# Patient Record
Sex: MALE | Race: WHITE | NOT HISPANIC OR LATINO | Employment: OTHER | URBAN - NONMETROPOLITAN AREA
[De-identification: names, ages, dates, MRNs, and addresses within clinical notes are randomized per-mention and may not be internally consistent; named-entity substitution may affect disease eponyms.]

---

## 2019-08-27 ENCOUNTER — LAB (OUTPATIENT)
Dept: LAB | Facility: HOSPITAL | Age: 69
End: 2019-08-27

## 2019-08-27 ENCOUNTER — OFFICE VISIT (OUTPATIENT)
Dept: CARDIOLOGY | Facility: CLINIC | Age: 69
End: 2019-08-27

## 2019-08-27 VITALS
WEIGHT: 210 LBS | SYSTOLIC BLOOD PRESSURE: 144 MMHG | HEIGHT: 70 IN | BODY MASS INDEX: 30.06 KG/M2 | OXYGEN SATURATION: 98 % | DIASTOLIC BLOOD PRESSURE: 85 MMHG | HEART RATE: 59 BPM

## 2019-08-27 DIAGNOSIS — I25.84 CORONARY ARTERY DISEASE DUE TO CALCIFIED CORONARY LESION: Primary | ICD-10-CM

## 2019-08-27 DIAGNOSIS — R00.2 PALPITATIONS: ICD-10-CM

## 2019-08-27 DIAGNOSIS — Z99.89 OSA ON CPAP: ICD-10-CM

## 2019-08-27 DIAGNOSIS — R61 DIAPHORESIS: ICD-10-CM

## 2019-08-27 DIAGNOSIS — I25.10 CORONARY ARTERY DISEASE DUE TO CALCIFIED CORONARY LESION: Primary | ICD-10-CM

## 2019-08-27 DIAGNOSIS — R06.02 SHORTNESS OF BREATH: ICD-10-CM

## 2019-08-27 DIAGNOSIS — R53.83 FATIGUE, UNSPECIFIED TYPE: ICD-10-CM

## 2019-08-27 DIAGNOSIS — I10 ESSENTIAL HYPERTENSION: ICD-10-CM

## 2019-08-27 DIAGNOSIS — G47.33 OSA ON CPAP: ICD-10-CM

## 2019-08-27 DIAGNOSIS — E78.2 MIXED HYPERLIPIDEMIA: ICD-10-CM

## 2019-08-27 DIAGNOSIS — R42 DIZZINESS: ICD-10-CM

## 2019-08-27 DIAGNOSIS — R07.2 PRECORDIAL PAIN: ICD-10-CM

## 2019-08-27 PROBLEM — Z87.891 FORMER SMOKER: Status: ACTIVE | Noted: 2019-08-27

## 2019-08-27 PROBLEM — C44.91 BASAL CELL CARCINOMA: Status: ACTIVE | Noted: 2019-08-27

## 2019-08-27 PROBLEM — K21.9 GERD (GASTROESOPHAGEAL REFLUX DISEASE): Status: ACTIVE | Noted: 2019-08-27

## 2019-08-27 PROCEDURE — 84443 ASSAY THYROID STIM HORMONE: CPT | Performed by: INTERNAL MEDICINE

## 2019-08-27 PROCEDURE — 36415 COLL VENOUS BLD VENIPUNCTURE: CPT

## 2019-08-27 PROCEDURE — 99204 OFFICE O/P NEW MOD 45 MIN: CPT | Performed by: INTERNAL MEDICINE

## 2019-08-27 RX ORDER — PANTOPRAZOLE SODIUM 40 MG/1
TABLET, DELAYED RELEASE ORAL DAILY
COMMUNITY
Start: 2019-08-21 | End: 2020-11-23 | Stop reason: SDUPTHER

## 2019-08-27 RX ORDER — AMLODIPINE BESYLATE 2.5 MG/1
2.5 TABLET ORAL DAILY
Qty: 30 TABLET | Refills: 11 | Status: SHIPPED | OUTPATIENT
Start: 2019-08-27 | End: 2019-10-28 | Stop reason: SDUPTHER

## 2019-08-27 RX ORDER — SIMVASTATIN 80 MG
TABLET ORAL NIGHTLY
COMMUNITY
Start: 2019-07-20 | End: 2019-09-04

## 2019-08-28 LAB — TSH SERPL DL<=0.05 MIU/L-ACNC: 0.53 MIU/ML (ref 0.27–4.2)

## 2019-09-04 ENCOUNTER — TELEPHONE (OUTPATIENT)
Dept: CARDIOLOGY | Facility: CLINIC | Age: 69
End: 2019-09-04

## 2019-09-04 DIAGNOSIS — E78.2 MIXED HYPERLIPIDEMIA: Primary | ICD-10-CM

## 2019-09-04 RX ORDER — PRAVASTATIN SODIUM 40 MG
40 TABLET ORAL DAILY
Qty: 30 TABLET | Refills: 3 | Status: SHIPPED | OUTPATIENT
Start: 2019-09-04 | End: 2019-10-23 | Stop reason: SDUPTHER

## 2019-09-04 NOTE — TELEPHONE ENCOUNTER
Zocor stopped.  Rx for Pravastatin sent to [facility].  Tried contacting patient x 2 with no success.  Will continue to try to contact patient.        ----- Message from JEFFERY Diaz sent at 9/4/2019 12:59 PM EDT -----  Regarding: RE: medication interaction  Change simvistatin to pravastatin 40mg  ----- Message -----  From: Fabiola Horn MA  Sent: 9/4/2019   8:34 AM  To: JEFFERY Diaz  Subject: medication interaction                           Mandie velasquez/ [facility] pharmacy contacts office with concerns of starting amlodipine while patient is taking Zocor 80 mg.  States contraindications with these two medications if patient is taking more than Zocor 20 mg.  Please advise.

## 2019-09-04 NOTE — TELEPHONE ENCOUNTER
Patient returned call and aware of the following. JENNIFER Corona Maeda, MA   Medical Assistant      Telephone Encounter   Addendum   Encounter Date:  9/4/2019                 Zocor stopped.  Rx for Pravastatin sent to [facility].  Tried contacting patient x 2 with no success.  Will continue to try to contact patient.          ----- Message from JEFFERY Diaz sent at 9/4/2019 12:59 PM EDT -----  Regarding: RE: medication interaction  Change simvistatin to pravastatin 40mg  ----- Message -----  From: Fabiola Horn MA  Sent: 9/4/2019   8:34 AM  To: JEFFERY Diaz  Subject: medication interaction                            Mandie velasquez/ [facility] contacts office with concerns of starting amlodipine while patient is taking Zocor 80 mg.  States contraindications with these two medications if patient is taking more than Zocor 20 mg.  Please advise.

## 2019-09-13 ENCOUNTER — TELEPHONE (OUTPATIENT)
Dept: CARDIOLOGY | Facility: CLINIC | Age: 69
End: 2019-09-13

## 2019-09-13 NOTE — TELEPHONE ENCOUNTER
Patient notified that with his symptoms Dr Bo is looking for possible A fib so that he needs to wear the monitor longer in order to get the best information for an accurate diagnosis. Pt verbalized understanding.  EMILE MORGAN    ----- Message from [place] sent at 9/13/2019 11:10 AM EDT -----   Pt called stating he was wearing monitor and wants to know why his was ordered for 30 days when most of the time it is for 14 days. He can be reached at [phone]

## 2019-09-17 ENCOUNTER — HOSPITAL ENCOUNTER (OUTPATIENT)
Dept: CARDIOLOGY | Facility: HOSPITAL | Age: 69
Discharge: HOME | End: 2019-09-17

## 2019-09-17 DIAGNOSIS — R42 DIZZINESS: ICD-10-CM

## 2019-09-17 DIAGNOSIS — R07.2 PRECORDIAL PAIN: ICD-10-CM

## 2019-09-17 DIAGNOSIS — R61 DIAPHORESIS: ICD-10-CM

## 2019-09-17 DIAGNOSIS — I25.84 CORONARY ARTERY DISEASE DUE TO CALCIFIED CORONARY LESION: ICD-10-CM

## 2019-09-17 DIAGNOSIS — R00.2 PALPITATIONS: ICD-10-CM

## 2019-09-17 DIAGNOSIS — R06.02 SHORTNESS OF BREATH: ICD-10-CM

## 2019-09-17 DIAGNOSIS — E78.2 MIXED HYPERLIPIDEMIA: ICD-10-CM

## 2019-09-17 DIAGNOSIS — I25.10 CORONARY ARTERY DISEASE DUE TO CALCIFIED CORONARY LESION: ICD-10-CM

## 2019-09-17 PROCEDURE — 78452 HT MUSCLE IMAGE SPECT MULT: CPT | Performed by: INTERNAL MEDICINE

## 2019-09-17 PROCEDURE — 25010000002 REGADENOSON 0.4 MG/5ML SOLUTION: Performed by: INTERNAL MEDICINE

## 2019-09-17 PROCEDURE — 78452 HT MUSCLE IMAGE SPECT MULT: CPT

## 2019-09-17 PROCEDURE — 93306 TTE W/DOPPLER COMPLETE: CPT | Performed by: INTERNAL MEDICINE

## 2019-09-17 PROCEDURE — 93306 TTE W/DOPPLER COMPLETE: CPT

## 2019-09-17 PROCEDURE — A9500 TC99M SESTAMIBI: HCPCS | Performed by: INTERNAL MEDICINE

## 2019-09-17 PROCEDURE — 93018 CV STRESS TEST I&R ONLY: CPT | Performed by: INTERNAL MEDICINE

## 2019-09-17 PROCEDURE — 0 TECHNETIUM SESTAMIBI: Performed by: INTERNAL MEDICINE

## 2019-09-17 PROCEDURE — 93017 CV STRESS TEST TRACING ONLY: CPT

## 2019-09-17 RX ADMIN — REGADENOSON 0.4 MG: 0.08 INJECTION, SOLUTION INTRAVENOUS at 12:00

## 2019-09-17 RX ADMIN — TECHNETIUM TC 99M SESTAMIBI 1 DOSE: 1 INJECTION INTRAVENOUS at 10:43

## 2019-09-17 RX ADMIN — TECHNETIUM TC 99M SESTAMIBI 1 DOSE: 1 INJECTION INTRAVENOUS at 12:00

## 2019-09-18 ENCOUNTER — TELEPHONE (OUTPATIENT)
Dept: CARDIOLOGY | Facility: CLINIC | Age: 69
End: 2019-09-18

## 2019-09-18 LAB
BH CV NUCLEAR PRIOR STUDY: 3
BH CV STRESS BP STAGE 1: NORMAL
BH CV STRESS COMMENTS STAGE 1: NORMAL
BH CV STRESS DOSE REGADENOSON STAGE 1: 0.4
BH CV STRESS DURATION MIN STAGE 1: 0
BH CV STRESS DURATION SEC STAGE 1: 10
BH CV STRESS HR STAGE 1: 93
BH CV STRESS PROTOCOL 1: NORMAL
BH CV STRESS RECOVERY BP: NORMAL MMHG
BH CV STRESS RECOVERY HR: 78 BPM
BH CV STRESS STAGE 1: 1
MAXIMAL PREDICTED HEART RATE: 150 BPM
PERCENT MAX PREDICTED HR: 64.67 %
STRESS BASELINE BP: NORMAL MMHG
STRESS BASELINE HR: 70 BPM
STRESS PERCENT HR: 76 %
STRESS POST PEAK BP: NORMAL MMHG
STRESS POST PEAK HR: 97 BPM
STRESS TARGET HR: 128 BPM

## 2019-09-18 NOTE — TELEPHONE ENCOUNTER
PATIENT AWARE STRESS TEST NEG. FOR ISCHEMIA AND TO KEEP F/U APPT. AS SCHEDULED. LUIS LUJAN        ----- Message from Ghassan Bo MD sent at 9/18/2019 12:21 PM EDT -----  Routine f/u.

## 2019-09-29 LAB
BH CV ECHO MEAS - ACS: 2.3 CM
BH CV ECHO MEAS - AO MEAN PG: 2.7 MMHG
BH CV ECHO MEAS - AO ROOT AREA (BSA CORRECTED): 1.6
BH CV ECHO MEAS - AO ROOT AREA: 9.2 CM^2
BH CV ECHO MEAS - AO ROOT DIAM: 3.4 CM
BH CV ECHO MEAS - AO V2 MEAN: 76.7 CM/SEC
BH CV ECHO MEAS - AO V2 VTI: 23.7 CM
BH CV ECHO MEAS - BSA(HAYCOCK): 2.2 M^2
BH CV ECHO MEAS - BSA: 2.1 M^2
BH CV ECHO MEAS - BZI_BMI: 30.1 KILOGRAMS/M^2
BH CV ECHO MEAS - BZI_METRIC_HEIGHT: 177.8 CM
BH CV ECHO MEAS - BZI_METRIC_WEIGHT: 95.3 KG
BH CV ECHO MEAS - EDV(CUBED): 62.7 ML
BH CV ECHO MEAS - EDV(MOD-SP4): 51 ML
BH CV ECHO MEAS - EDV(TEICH): 68.9 ML
BH CV ECHO MEAS - EF(CUBED): 66.6 %
BH CV ECHO MEAS - EF(MOD-SP4): 54.9 %
BH CV ECHO MEAS - EF(TEICH): 58.7 %
BH CV ECHO MEAS - ESV(CUBED): 21 ML
BH CV ECHO MEAS - ESV(MOD-SP4): 23 ML
BH CV ECHO MEAS - ESV(TEICH): 28.4 ML
BH CV ECHO MEAS - FS: 30.6 %
BH CV ECHO MEAS - IVS/LVPW: 0.84
BH CV ECHO MEAS - IVSD: 0.98 CM
BH CV ECHO MEAS - LA DIMENSION: 3.5 CM
BH CV ECHO MEAS - LA/AO: 1
BH CV ECHO MEAS - LV DIASTOLIC VOL/BSA (35-75): 23.9 ML/M^2
BH CV ECHO MEAS - LV IVRT: 0.11 SEC
BH CV ECHO MEAS - LV MASS(C)D: 139.7 GRAMS
BH CV ECHO MEAS - LV MASS(C)DI: 65.5 GRAMS/M^2
BH CV ECHO MEAS - LV SYSTOLIC VOL/BSA (12-30): 10.8 ML/M^2
BH CV ECHO MEAS - LVIDD: 4 CM
BH CV ECHO MEAS - LVIDS: 2.8 CM
BH CV ECHO MEAS - LVLD AP4: 6.9 CM
BH CV ECHO MEAS - LVLS AP4: 6.5 CM
BH CV ECHO MEAS - LVOT AREA (M): 3.5 CM^2
BH CV ECHO MEAS - LVOT AREA: 3.6 CM^2
BH CV ECHO MEAS - LVOT DIAM: 2.1 CM
BH CV ECHO MEAS - LVPWD: 1.2 CM
BH CV ECHO MEAS - MV A MAX VEL: 69.1 CM/SEC
BH CV ECHO MEAS - MV DEC SLOPE: 268.7 CM/SEC^2
BH CV ECHO MEAS - MV E MAX VEL: 80.5 CM/SEC
BH CV ECHO MEAS - MV E/A: 1.2
BH CV ECHO MEAS - RVDD: 2.7 CM
BH CV ECHO MEAS - SI(AO): 102.7 ML/M^2
BH CV ECHO MEAS - SI(CUBED): 19.6 ML/M^2
BH CV ECHO MEAS - SI(MOD-SP4): 13.1 ML/M^2
BH CV ECHO MEAS - SI(TEICH): 19 ML/M^2
BH CV ECHO MEAS - SV(AO): 218.8 ML
BH CV ECHO MEAS - SV(CUBED): 41.8 ML
BH CV ECHO MEAS - SV(MOD-SP4): 28 ML
BH CV ECHO MEAS - SV(TEICH): 40.4 ML
MAXIMAL PREDICTED HEART RATE: 150 BPM
STRESS TARGET HR: 128 BPM

## 2019-09-30 ENCOUNTER — TELEPHONE (OUTPATIENT)
Dept: CARDIOLOGY | Facility: CLINIC | Age: 69
End: 2019-09-30

## 2019-09-30 NOTE — TELEPHONE ENCOUNTER
ECHO RESULTS DISCUSSED WITH MR. LUU. LE,LPN          ----- Message from Keiry Lloyd sent at 9/30/2019  1:11 PM EDT -----  PT REQUESTING TEST RESULTS

## 2019-10-23 DIAGNOSIS — E78.2 MIXED HYPERLIPIDEMIA: ICD-10-CM

## 2019-10-23 RX ORDER — PRAVASTATIN SODIUM 40 MG
40 TABLET ORAL DAILY
Qty: 90 TABLET | Refills: 3 | Status: SHIPPED | OUTPATIENT
Start: 2019-10-23 | End: 2020-11-23

## 2019-10-28 ENCOUNTER — OFFICE VISIT (OUTPATIENT)
Dept: CARDIOLOGY | Facility: CLINIC | Age: 69
End: 2019-10-28

## 2019-10-28 VITALS
HEART RATE: 66 BPM | HEIGHT: 70 IN | WEIGHT: 208.6 LBS | DIASTOLIC BLOOD PRESSURE: 81 MMHG | OXYGEN SATURATION: 97 % | BODY MASS INDEX: 29.86 KG/M2 | SYSTOLIC BLOOD PRESSURE: 138 MMHG

## 2019-10-28 DIAGNOSIS — I25.10 CORONARY ARTERY DISEASE DUE TO CALCIFIED CORONARY LESION: ICD-10-CM

## 2019-10-28 DIAGNOSIS — R06.02 SHORTNESS OF BREATH: ICD-10-CM

## 2019-10-28 DIAGNOSIS — Z99.89 OSA ON CPAP: ICD-10-CM

## 2019-10-28 DIAGNOSIS — R61 DIAPHORESIS: ICD-10-CM

## 2019-10-28 DIAGNOSIS — E78.2 MIXED HYPERLIPIDEMIA: Primary | ICD-10-CM

## 2019-10-28 DIAGNOSIS — I25.84 CORONARY ARTERY DISEASE DUE TO CALCIFIED CORONARY LESION: ICD-10-CM

## 2019-10-28 DIAGNOSIS — I10 ESSENTIAL HYPERTENSION: ICD-10-CM

## 2019-10-28 DIAGNOSIS — G47.33 OSA ON CPAP: ICD-10-CM

## 2019-10-28 DIAGNOSIS — R07.2 PRECORDIAL PAIN: ICD-10-CM

## 2019-10-28 DIAGNOSIS — Z87.891 FORMER SMOKER: ICD-10-CM

## 2019-10-28 PROCEDURE — 99213 OFFICE O/P EST LOW 20 MIN: CPT | Performed by: INTERNAL MEDICINE

## 2019-10-28 RX ORDER — AMLODIPINE BESYLATE 2.5 MG/1
2.5 TABLET ORAL DAILY
Qty: 90 TABLET | Refills: 3 | Status: SHIPPED | OUTPATIENT
Start: 2019-10-28 | End: 2020-09-11 | Stop reason: SDUPTHER

## 2019-10-28 NOTE — PROGRESS NOTES
Subjective   Anthony Sahu is a 68 y.o. male     Chief Complaint   Patient presents with   • Coronary Artery Disease     Here for 2 mo. follow up    • Palpitations   • Hyperlipidemia       PROBLEM LIST:     1. CAD  1.1 [facility], , demonstrated mod. Mid-LAD disease. EF 65%  1.2 Stress test, 9-17-19, no ischemia  2. Hyperlipidemia  3. RAMONA, uses c-pap, followed by Dr. Daniels.  4. Basal cell Carcinoma left ear  5. GERD  6. Shortness of breath  7. Former smoker  8. Echo, 9-17-19, grade 1 DD, trivial-mild TR, pulm. Pressures 30-35 mmHg  9. Palpitations  9.1 Event Monitor, 9-5-19 - 10-4-19, sinus, SB sleeping hors, occas. PVC'S      Specialty Problems        Cardiology Problems    Coronary artery disease due to calcified coronary lesion        Mixed hyperlipidemia                HPI:  Anthony returns for follow-up on test results.  His chest pain has resolved but he continues to have episodic predominantly exertional dyspnea without cough or wheeze.    Stress test demonstrated no evidence of ischemia with preserved LV systolic function.    Echo confirmed normal ejection fraction with grade 1 diastolic dysfunction, no significant valve, pericardial, or great vessel pathology, and normal pulmonary pressures.    Event monitor demonstrated PVCs majority of symptoms occurring during sinus rhythm or sinus tachycardia.  No sustained ectopy was identified.    Jessica is tolerated low-dose amlodipine well without orthostasis or edema.                PRIOR MEDICATIONS    Current Outpatient Medications on File Prior to Visit   Medication Sig Dispense Refill   • amLODIPine (NORVASC) 2.5 MG tablet Take 1 tablet by mouth Daily. 30 tablet 11   • aspirin 81 MG tablet Take 81 mg by mouth Daily.     • pantoprazole (PROTONIX) 40 MG EC tablet Daily.     • pravastatin (PRAVACHOL) 40 MG tablet Take 1 tablet by mouth Daily for 120 days. 90 tablet 3     No current facility-administered medications on file prior to visit.         ALLERGIES:    Penicillins; Doxycycline; Keflex [cephalexin]; and Meclizine    PAST MEDICAL HISTORY:    Past Medical History:   Diagnosis Date   • Cancer (CMS/HCC)     basal cell carcinoma left ear   • Chest pain    • GERD (gastroesophageal reflux disease)    • Hyperlipidemia    • Shortness of breath    • Sleep apnea     uses c-pap followed by Dr. waters.   • Stomach ulcer        SURGICAL HISTORY:    Past Surgical History:   Procedure Laterality Date   • ENDOSCOPY AND COLONOSCOPY     • EXTRACORPOREAL SHOCK WAVE LITHOTRIPSY (ESWL)     • INGUINAL HERNIA REPAIR     • TESTICLE SURGERY      removal x 1       SOCIAL HISTORY:    Social History     Socioeconomic History   • Marital status:      Spouse name: Not on file   • Number of children: Not on file   • Years of education: Not on file   • Highest education level: Not on file   Tobacco Use   • Smoking status: Former Smoker     Types: Cigarettes   • Smokeless tobacco: Never Used   • Tobacco comment: used to smoke approx 1 PPD for approx. 30 yrs.    Substance and Sexual Activity   • Alcohol use: No     Frequency: Never   • Drug use: No       FAMILY HISTORY:    Family History   Problem Relation Age of Onset   • No Known Problems Mother    • Stroke Father    • Heart attack Father    • Hypertension Father    • Hyperlipidemia Father    • Diabetes Father        Review of Systems   Constitutional: Positive for fatigue.   HENT: Negative.    Eyes: Negative.    Respiratory: Positive for shortness of breath (occas.).    Cardiovascular: Negative.    Gastrointestinal: Negative.    Endocrine: Negative.    Genitourinary: Negative.    Musculoskeletal: Positive for arthralgias and myalgias.   Skin: Negative.    Allergic/Immunologic: Negative.    Neurological: Negative.    Hematological: Bruises/bleeds easily (bruise).   Psychiatric/Behavioral: Positive for sleep disturbance.       VISIT VITALS:  Vitals:    10/28/19 1023   BP: 138/81   BP Location: Left arm   Patient  "Position: Sitting   Pulse: 66   SpO2: 97%   Weight: 94.6 kg (208 lb 9.6 oz)   Height: 177.8 cm (70\")      /81 (BP Location: Left arm, Patient Position: Sitting)   Pulse 66   Ht 177.8 cm (70\")   Wt 94.6 kg (208 lb 9.6 oz)   SpO2 97%   BMI 29.93 kg/m²     RECENT LABS:    Objective       Physical Exam    Procedures      Assessment/Plan   #1.  Coronary artery disease.  Mr. Sahu had minor nonobstructive disease by cath approximately a decade ago.  Recent stress test demonstrated no evidence of ischemia.  As symptoms have resolved, only risk factor modification is indicated at this time.  In that regard patient had fasting lipid profile liver enzymes done in the next month at Dr. Reyes's office and we will request those.    2.  Systemic hypertension.  Blood pressures are controlled on very low dose amlodipine.  We will continue.    3.  Treated dyslipidemia as above.    4.  Palpitations without significant dysrhythmia on event monitoring.  No further evaluation at this time.    5.  Exertional dyspnea.  Given the negative work-up above I think is reasonable to perform pulmonary function studies to further evaluate for reactive airways disease or pulmonary parenchymal disease as contributing factors.  Mr. Brown will follow with Dr. Reyes in that regard and we will plan on seeing him in our office on a yearly or as needed basis.   Diagnosis Plan   1. Mixed hyperlipidemia     2. Coronary artery disease due to calcified coronary lesion     3. RAMONA on CPAP     4. Shortness of breath     5. Precordial pain     6. Former smoker     7. Diaphoresis         No Follow-up on file.           Patient's Body mass index is 29.93 kg/m². BMI is above normal parameters. Recommendations include: educational material and referral to primary care.       Yuly Fish LPN    Scribed for Dr. Ghassan Bo by Yuly Fish LPN October 28, 2019 10:49 AM         Electronically signed by:            This note is dictated " utilizing voice recognition software.  Although this record has been proof read, transcriptional errors may still be present. If questions occur regarding the content of this record please do not hesitate to call our office.

## 2019-10-28 NOTE — PATIENT INSTRUCTIONS
"Fat and Cholesterol Restricted Eating Plan  Getting too much fat and cholesterol in your diet may cause health problems. Choosing the right foods helps keep your fat and cholesterol at normal levels. This can keep you from getting certain diseases.  Your doctor may recommend an eating plan that includes:  · Total fat: ______% or less of total calories a day.  · Saturated fat: ______% or less of total calories a day.  · Cholesterol: less than _________mg a day.  · Fiber: ______g a day.  What are tips for following this plan?  General tips    · Work with your doctor to lose weight if you need to.  · Avoid:  ? Foods with added sugar.  ? Fried foods.  ? Foods with partially hydrogenated oils.  · Limit alcohol intake to no more than 1 drink a day for nonpregnant women and 2 drinks a day for men. One drink equals 12 oz of beer, 5 oz of wine, or 1½ oz of hard liquor.  Reading food labels  · Check food labels for:  ? Trans fats.  ? Partially hydrogenated oils.  ? Saturated fat (g) in each serving.  ? Cholesterol (mg) in each serving.  ? Fiber (g) in each serving.  · Choose foods with healthy fats, such as:  ? Monounsaturated fats.  ? Polyunsaturated fats.  ? Omega-3 fats.  · Choose grain products that have whole grains. Look for the word \"whole\" as the first word in the ingredient list.  Cooking  · Cook foods using low-fat methods. These include baking, boiling, grilling, and broiling.  · Eat more home-cooked foods. Eat at restaurants and buffets less often.  · Avoid cooking using saturated fats, such as butter, cream, palm oil, palm kernel oil, and coconut oil.  Meal planning    · At meals, divide your plate into four equal parts:  ? Fill one-half of your plate with vegetables and green salads.  ? Fill one-fourth of your plate with whole grains.  ? Fill one-fourth of your plate with low-fat (lean) protein foods.  · Eat fish that is high in omega-3 fats at least two times a week. This includes mackerel, tuna, sardines, and " salmon.  · Eat foods that are high in fiber, such as whole grains, beans, apples, broccoli, carrots, peas, and barley.  Recommended foods  Grains  · Whole grains, such as whole wheat or whole grain breads, crackers, cereals, and pasta. Unsweetened oatmeal, bulgur, barley, quinoa, or brown rice. Corn or whole wheat flour tortillas.  Vegetables  · Fresh or frozen vegetables (raw, steamed, roasted, or grilled). Green salads.  Fruits  · All fresh, canned (in natural juice), or frozen fruits.  Meats and other protein foods  · Ground beef (85% or leaner), grass-fed beef, or beef trimmed of fat. Skinless chicken or turkey. Ground chicken or turkey. Pork trimmed of fat. All fish and seafood. Egg whites. Dried beans, peas, or lentils. Unsalted nuts or seeds. Unsalted canned beans. Nut butters without added sugar or oil.  Dairy  · Low-fat or nonfat dairy products, such as skim or 1% milk, 2% or reduced-fat cheeses, low-fat and fat-free ricotta or cottage cheese, or plain low-fat and nonfat yogurt.  Fats and oils  · Tub margarine without trans fats. Light or reduced-fat mayonnaise and salad dressings. Avocado. Olive, canola, sesame, or safflower oils.  The items listed above may not be a complete list of recommended foods or beverages. Contact your dietitian for more options.  The items listed above may not be a complete list of foods and beverages [you/your child] can eat. Contact a dietitian for more information.  Foods to avoid  Grains  · White bread. White pasta. White rice. Cornbread. Bagels, pastries, and croissants. Crackers and snack foods that contain trans fat and hydrogenated oils.  Vegetables  · Vegetables cooked in cheese, cream, or butter sauce. Fried vegetables.  Fruits  · Canned fruit in heavy syrup. Fruit in cream or butter sauce. Fried fruit.  Meats and other protein foods  · Fatty cuts of meat. Ribs, chicken wings, alcazar, sausage, bologna, salami, chitterlings, fatback, hot dogs, bratwurst, and packaged  lunch meats. Liver and organ meats. Whole eggs and egg yolks. Chicken and turkey with skin. Fried meat.  Dairy  · Whole or 2% milk, cream, half-and-half, and cream cheese. Whole milk cheeses. Whole-fat or sweetened yogurt. Full-fat cheeses. Nondairy creamers and whipped toppings. Processed cheese, cheese spreads, and cheese curds.  Beverages  · Alcohol. Sugar-sweetened drinks such as sodas, lemonade, and fruit drinks.  Fats and oils  · Butter, stick margarine, lard, shortening, ghee, or alcazar fat. Coconut, palm kernel, and palm oils.  Sweets and desserts  · Corn syrup, sugars, honey, and molasses. Candy. Jam and jelly. Syrup. Sweetened cereals. Cookies, pies, cakes, donuts, muffins, and ice cream.  The items listed above may not be a complete list of foods and beverages to avoid. Contact your dietitian for more information.  The items listed above may not be a complete list of foods and beverages [you/your child] should avoid. Contact a dietitian for more information.  Summary  · Choosing the right foods helps keep your fat and cholesterol at normal levels. This can keep you from getting certain diseases.  · At meals, fill one-half of your plate with vegetables and green salads.  · Eat high-fiber foods, like whole grains, beans, apples, carrots, peas, and barley.  · Limit added sugar, saturated fats, alcohol, and fried foods.  This information is not intended to replace advice given to you by your health care provider. Make sure you discuss any questions you have with your health care provider.  Document Released: 06/18/2013 Document Revised: 09/04/2018 Document Reviewed: 09/04/2018  [employer] Interactive Patient Education © 2019 [employer]  BMI for Adults    Body mass index (BMI) is a number that is calculated from a person's weight and height. BMI may help to estimate how much of a person's weight is composed of fat. BMI can help identify those who may be at higher risk for certain medical problems.  How is BMI  "used with adults?  BMI is used as a screening tool to identify possible weight problems. It is used to check whether a person is obese, overweight, healthy weight, or underweight.  How is BMI calculated?  BMI measures your weight and compares it to your height. This can be done either in English ([place] or metric measurements. Note that charts are available to help you find your BMI quickly and easily without having to do these calculations yourself.  To calculate your BMI in English ([place] measurements, your health care provider will:  1. Measure your weight in pounds (lb).  2. Multiply the number of pounds by 703.  ? For example, for a person who weighs 180 lb, multiply that number by 703, which equals 126,540.  3. Measure your height in inches (in). Then multiply that number by itself to get a measurement called \"inches squared.\"  ? For example, for a person who is 70 in tall, the \"inches squared\" measurement is 70 in x 70 in, which equals 4900 inches squared.  4. Divide the total from Step 2 (number of lb x 703) by the total from Step 3 (inches squared): 126,540 ÷ 4900 = 25.8. This is your BMI.  To calculate your BMI in metric measurements, your health care provider will:  1. Measure your weight in kilograms (kg).  2. Measure your height in meters (m). Then multiply that number by itself to get a measurement called \"meters squared.\"  ? For example, for a person who is 1.75 m tall, the \"meters squared\" measurement is 1.75 m x 1.75 m, which is equal to 3.1 meters squared.  3. Divide the number of kilograms (your weight) by the meters squared number. In this example: 70 ÷ 3.1 = 22.6. This is your BMI.  How is BMI interpreted?  To interpret your results, your health care provider will use BMI charts to identify whether you are underweight, normal weight, overweight, or obese. The following guidelines will be used:  · Underweight: BMI less than 18.5.  · Normal weight: BMI between 18.5 and 24.9.  · Overweight: BMI " between 25 and 29.9.  · Obese: BMI of 30 and above.  Please note:  · Weight includes both fat and muscle, so someone with a muscular build, such as an athlete, may have a BMI that is higher than 24.9. In cases like these, BMI is not an accurate measure of body fat.  · To determine if excess body fat is the cause of a BMI of 25 or higher, further assessments may need to be done by a health care provider.  · BMI is usually interpreted in the same way for men and women.  Why is BMI a useful tool?  BMI is useful in two ways:  · Identifying a weight problem that may be related to a medical condition, or that may increase the risk for medical problems.  · Promoting lifestyle and diet changes in order to reach a healthy weight.  Summary  · Body mass index (BMI) is a number that is calculated from a person's weight and height.  · BMI may help to estimate how much of a person's weight is composed of fat. BMI can help identify those who may be at higher risk for certain medical problems.  · BMI can be measured using English measurements or metric measurements.  · To interpret your results, your health care provider will use BMI charts to identify whether you are underweight, normal weight, overweight, or obese.  This information is not intended to replace advice given to you by your health care provider. Make sure you discuss any questions you have with your health care provider.  Document Released: 08/29/2005 Document Revised: 10/31/2018 Document Reviewed: 10/31/2018  [employer] Interactive Patient Education © 2019 [employer]

## 2020-09-11 DIAGNOSIS — I10 ESSENTIAL HYPERTENSION: ICD-10-CM

## 2020-09-11 RX ORDER — AMLODIPINE BESYLATE 2.5 MG/1
2.5 TABLET ORAL DAILY
Qty: 90 TABLET | Refills: 3 | Status: SHIPPED | OUTPATIENT
Start: 2020-09-11 | End: 2021-09-13 | Stop reason: SDUPTHER

## 2020-09-11 NOTE — TELEPHONE ENCOUNTER
Pt LVM requesting RF of Amlodipine 2.5mg be sent to [facility].       Sent in Rf, as requested.

## 2020-11-22 DIAGNOSIS — E78.2 MIXED HYPERLIPIDEMIA: ICD-10-CM

## 2020-11-23 DIAGNOSIS — E78.2 MIXED HYPERLIPIDEMIA: ICD-10-CM

## 2020-11-23 RX ORDER — PANTOPRAZOLE SODIUM 40 MG/1
40 TABLET, DELAYED RELEASE ORAL DAILY
Qty: 30 TABLET | Refills: 11 | Status: SHIPPED | OUTPATIENT
Start: 2020-11-23 | End: 2020-12-08

## 2020-11-23 RX ORDER — PRAVASTATIN SODIUM 40 MG
TABLET ORAL
Qty: 90 TABLET | Refills: 2 | OUTPATIENT
Start: 2020-11-23

## 2020-11-23 NOTE — TELEPHONE ENCOUNTER
Pt's wife LVM stating he needs RF of Protonix sent to [facility].       Pended RF.

## 2020-11-24 RX ORDER — PRAVASTATIN SODIUM 40 MG
TABLET ORAL
Qty: 90 TABLET | Refills: 3 | Status: SHIPPED | OUTPATIENT
Start: 2020-11-24 | End: 2022-01-10

## 2020-12-08 ENCOUNTER — OFFICE VISIT (OUTPATIENT)
Dept: CARDIOLOGY | Facility: CLINIC | Age: 70
End: 2020-12-08

## 2020-12-08 VITALS
HEART RATE: 86 BPM | WEIGHT: 208.4 LBS | HEIGHT: 70 IN | OXYGEN SATURATION: 97 % | SYSTOLIC BLOOD PRESSURE: 141 MMHG | DIASTOLIC BLOOD PRESSURE: 88 MMHG | BODY MASS INDEX: 29.84 KG/M2

## 2020-12-08 DIAGNOSIS — R06.02 SHORTNESS OF BREATH: ICD-10-CM

## 2020-12-08 DIAGNOSIS — G47.33 OSA ON CPAP: ICD-10-CM

## 2020-12-08 DIAGNOSIS — I25.10 CORONARY ARTERY DISEASE DUE TO CALCIFIED CORONARY LESION: Primary | ICD-10-CM

## 2020-12-08 DIAGNOSIS — I25.84 CORONARY ARTERY DISEASE DUE TO CALCIFIED CORONARY LESION: Primary | ICD-10-CM

## 2020-12-08 DIAGNOSIS — Z99.89 OSA ON CPAP: ICD-10-CM

## 2020-12-08 DIAGNOSIS — E78.2 MIXED HYPERLIPIDEMIA: ICD-10-CM

## 2020-12-08 DIAGNOSIS — R00.2 PALPITATIONS: ICD-10-CM

## 2020-12-08 DIAGNOSIS — R07.2 PRECORDIAL PAIN: ICD-10-CM

## 2020-12-08 DIAGNOSIS — Z87.891 FORMER SMOKER: ICD-10-CM

## 2020-12-08 PROCEDURE — 93000 ELECTROCARDIOGRAM COMPLETE: CPT | Performed by: INTERNAL MEDICINE

## 2020-12-08 PROCEDURE — 99213 OFFICE O/P EST LOW 20 MIN: CPT | Performed by: INTERNAL MEDICINE

## 2020-12-08 NOTE — PATIENT INSTRUCTIONS
Obesity, Adult  Obesity is the condition of having too much total body fat. Being overweight or obese means that your weight is greater than what is considered healthy for your body size. Obesity is determined by a measurement called BMI. BMI is an estimate of body fat and is calculated from height and weight. For adults, a BMI of 30 or higher is considered obese.  Obesity can lead to other health concerns and major illnesses, including:  · Stroke.  · Coronary artery disease (CAD).  · Type 2 diabetes.  · Some types of cancer, including cancers of the colon, breast, uterus, and gallbladder.  · Osteoarthritis.  · High blood pressure (hypertension).  · High cholesterol.  · Sleep apnea.  · Gallbladder stones.  · Infertility problems.  What are the causes?  Common causes of this condition include:  · Eating daily meals that are high in calories, sugar, and fat.  · Being born with genes that may make you more likely to become obese.  · Having a medical condition that causes obesity, including:  ? Hypothyroidism.  ? Polycystic ovarian syndrome (PCOS).  ? Binge-eating disorder.  ? Cushing syndrome.  · Taking certain medicines, such as steroids, antidepressants, and seizure medicines.  · Not being physically active (sedentary lifestyle).  · Not getting enough sleep.  · Drinking high amounts of sugar-sweetened beverages, such as soft drinks.  What increases the risk?  The following factors may make you more likely to develop this condition:  · Having a family history of obesity.  · Being a woman of  descent.  · Being a man of  descent.  · Living in an area with limited access to:  ? [place], recreation centers, or sidewalks.  ? Healthy food choices, such as grocery stores and farmers' markets.  What are the signs or symptoms?  The main sign of this condition is having too much body fat.  How is this diagnosed?  This condition is diagnosed based on:  · Your BMI. If you are an adult with a BMI of 30 or  higher, you are considered obese.  · Your waist circumference. This measures the distance around your waistline.  · Your skinfold thickness. Your health care provider may gently pinch a fold of your skin and measure it.  You may have other tests to check for underlying conditions.  How is this treated?  Treatment for this condition often includes changing your lifestyle. Treatment may include some or all of the following:  · Dietary changes. This may include developing a healthy meal plan.  · Regular physical activity. This may include activity that causes your heart to beat faster (aerobic exercise) and strength training. Work with your health care provider to design an exercise program that works for you.  · Medicine to help you lose weight if you are unable to lose 1 pound a week after 6 weeks of healthy eating and more physical activity.  · Treating conditions that cause the obesity (underlying conditions).  · Surgery. Surgical options may include gastric banding and gastric bypass. Surgery may be done if:  ? Other treatments have not helped to improve your condition.  ? You have a BMI of 40 or higher.  ? You have life-threatening health problems related to obesity.  Follow these instructions at home:  Eating and drinking    · Follow recommendations from your health care provider about what you eat and drink. Your health care provider may advise you to:  ? Limit fast food, sweets, and processed snack foods.  ? Choose low-fat options, such as low-fat milk instead of whole milk.  ? Eat 5 or more servings of fruits or vegetables every day.  ? Eat at home more often. This gives you more control over what you eat.  ? Choose healthy foods when you eat out.  ? Learn to read food labels. This will help you understand how much food is considered 1 serving.  ? Learn what a healthy serving size is.  ? Keep low-fat snacks available.  ? Limit sugary drinks, such as soda, fruit juice, sweetened iced tea, and flavored  milk.  · Drink enough water to keep your urine pale yellow.  · Do not follow a fad diet. Fad diets can be unhealthy and even dangerous.  Physical activity  · Exercise regularly, as told by your health care provider.  ? Most adults should get up to 150 minutes of moderate-intensity exercise every week.  ? Ask your health care provider what types of exercise are safe for you and how often you should exercise.  · Warm up and stretch before being active.  · Cool down and stretch after being active.  · Rest between periods of activity.  Lifestyle  · Work with your health care provider and a dietitian to set a weight-loss goal that is healthy and reasonable for you.  · Limit your screen time.  · Find ways to reward yourself that do not involve food.  · Do not drink alcohol if:  ? Your health care provider tells you not to drink.  ? You are pregnant, may be pregnant, or are planning to become pregnant.  · If you drink alcohol:  ? Limit how much you use to:  § 0-1 drink a day for women.  § 0-2 drinks a day for men.  ? Be aware of how much alcohol is in your drink. In the [place] one drink equals one 12 oz bottle of beer (355 mL), one 5 oz glass of wine (148 mL), or one 1½ oz glass of hard liquor (44 mL).  General instructions  · Keep a weight-loss journal to keep track of the food you eat and how much exercise you get.  · Take over-the-counter and prescription medicines only as told by your health care provider.  · Take vitamins and supplements only as told by your health care provider.  · Consider joining a support group. Your health care provider may be able to recommend a support group.  · Keep all follow-up visits as told by your health care provider. This is important.  Contact a health care provider if:  · You are unable to meet your weight loss goal after 6 weeks of dietary and lifestyle changes.  Get help right away if you are having:  · Trouble breathing.  · Suicidal thoughts or behaviors.  Summary  · Obesity is the  condition of having too much total body fat.  · Being overweight or obese means that your weight is greater than what is considered healthy for your body size.  · Work with your health care provider and a dietitian to set a weight-loss goal that is healthy and reasonable for you.  · Exercise regularly, as told by your health care provider. Ask your health care provider what types of exercise are safe for you and how often you should exercise.  This information is not intended to replace advice given to you by your health care provider. Make sure you discuss any questions you have with your health care provider.  Document Revised: 08/22/2019 Document Reviewed: 08/22/2019  [employer] Patient Education © 2020 [employer]  MyPlate from [employer]    MyPlate is an outline of a general healthy diet based on the 2010 Dietary Guidelines for Americans, from the [place] Department of Agriculture (USDA). It sets guidelines for how much food you should eat from each food group based on your age, sex, and level of physical activity.  What are tips for following MyPlate?  To follow MyPlate recommendations:  · Eat a wide variety of fruits and vegetables, grains, and protein foods.  · Serve smaller portions and eat less food throughout the day.  · Limit portion sizes to avoid overeating.  · Enjoy your food.  · Get at least 150 minutes of exercise every week. This is about 30 minutes each day, 5 or more days per week.  It can be difficult to have every meal look like MyPlate. Think about MyPlate as eating guidelines for an entire day, rather than each individual meal.  Fruits and vegetables  · Make half of your plate fruits and vegetables.  · Eat many different colors of fruits and vegetables each day.  · For a 2,000 calorie daily food plan, eat:  ? 2½ cups of vegetables every day.  ? 2 cups of fruit every day.  · 1 cup is equal to:  ? 1 cup raw or cooked vegetables.  ? 1 cup raw fruit.  ? 1 medium-sized orange, apple, or banana.  ? 1 cup  100% fruit or vegetable juice.  ? 2 cups raw leafy greens, such as lettuce, spinach, or kale.  ? ½ cup dried fruit.  Grains  · One fourth of your plate should be grains.  · Make at least half of the grains you eat each day whole grains.  · For a 2,000 calorie daily food plan, eat 6 oz of grains every day.  · 1 oz is equal to:  ? 1 slice bread.  ? 1 cup cereal.  ? ½ cup cooked rice, cereal, or pasta.  Protein  · One fourth of your plate should be protein.  · Eat a wide variety of protein foods, including meat, poultry, fish, eggs, beans, nuts, and tofu.  · For a 2,000 calorie daily food plan, eat 5½ oz of protein every day.  · 1 oz is equal to:  ? 1 oz meat, poultry, or fish.  ? ¼ cup cooked beans.  ? 1 egg.  ? ½ oz nuts or seeds.  ? 1 Tbsp peanut butter.  Dairy  · Drink fat-free or low-fat (1%) milk.  · Eat or drink dairy as a side to meals.  · For a 2,000 calorie daily food plan, eat or drink 3 cups of dairy every day.  · 1 cup is equal to:  ? 1 cup milk, yogurt, cottage cheese, or soy milk (soy beverage).  ? 2 oz processed cheese.  ? 1½ oz natural cheese.  Fats, oils, salt, and sugars  · Only small amounts of oils are recommended.  · Avoid foods that are high in calories and low in nutritional value (empty calories), like foods high in fat or added sugars.  · Choose foods that are low in salt (sodium). Choose foods that have less than 140 milligrams (mg) of sodium per serving.  · Drink water instead of sugary drinks. Drink enough water each day to keep your urine pale yellow.  Where to find support  · Work with your health care provider or a nutrition specialist (dietitian) to develop a customized eating plan that is right for you.  · Download an jonathan (mobile application) to help you track your daily food intake.  Where to find more information  · Go to ChooseMyPlate.gov for more information.  Summary  · MyPlate is a general guideline for healthy eating from the USDA. It is based on the 2010 Dietary Guidelines for  Americans.  · In general, fruits and vegetables should take up ½ of your plate, grains should take up ¼ of your plate, and protein should take up ¼ of your plate.  This information is not intended to replace advice given to you by your health care provider. Make sure you discuss any questions you have with your health care provider.  Document Revised: 05/21/2020 Document Reviewed: 03/19/2018  Elsevier Patient Education © 2020 [employer]

## 2020-12-08 NOTE — PROGRESS NOTES
Subjective   Anthony Sahu is a 69 y.o. male     Chief Complaint   Patient presents with   • Follow-up     Here for yearly f/u   • Coronary Artery Disease   • Hyperlipidemia   • Sleep Apnea       PROBLEM LIST:     1. CAD  1.1 [facility], , demonstrated mod. Mid-LAD disease. EF 65%  1.2 Stress test, 9-17-19, no ischemia  2. Hyperlipidemia  3. RAMONA, uses c-pap, followed by Dr. Daniels.  4. Basal cell Carcinoma left ear  5. GERD  6. Shortness of breath  7. Former smoker  8. Echo, 9-17-19, grade 1 DD, trivial-mild TR, pulm. Pressures 30-35 mmHg  9. Palpitations  9.1 Event Monitor, 9-5-19 - 10-4-19, sinus, SB sleeping hors, occas. PVC'S    Specialty Problems        Cardiology Problems    Coronary artery disease due to calcified coronary lesion        Mixed hyperlipidemia                HPI:  Anthony returns for follow-up on the above.    Overall, he is done well has had no significant chest pain.  He does describe class II exertional dyspnea but his threshold for shortness of breath has not changed over time.  He relates no orthopnea, PND, or lower extremity edema.  He has rare short-lived episodes of palpitations unchanged from prior.  Prior event monitor demonstrated no A. fib.    Anthony denies any symptoms of TIA or stroke, or of peripheral arterial disease.  He does not check his blood pressures regularly.  We have no recent fasting lipid profile data.                      PRIOR MEDICATIONS    Current Outpatient Medications on File Prior to Visit   Medication Sig Dispense Refill   • amLODIPine (NORVASC) 2.5 MG tablet Take 1 tablet by mouth Daily. 90 tablet 3   • aspirin 81 MG tablet Take 81 mg by mouth Daily.     • pravastatin (PRAVACHOL) 40 MG tablet TAKE ONE TABLET BY MOUTH DAILY 90 tablet 3   • [DISCONTINUED] pantoprazole (PROTONIX) 40 MG EC tablet Take 1 tablet by mouth Daily. 30 tablet 11     No current facility-administered medications on file prior to visit.        ALLERGIES:    Penicillins, Doxycycline, Keflex  [cephalexin], and Meclizine    PAST MEDICAL HISTORY:    Past Medical History:   Diagnosis Date   • Cancer (CMS/HCC)     basal cell carcinoma left ear   • Chest pain    • GERD (gastroesophageal reflux disease)    • Hyperlipidemia    • Shortness of breath    • Sleep apnea     uses c-pap followed by Dr. waters.   • Stomach ulcer        SURGICAL HISTORY:    Past Surgical History:   Procedure Laterality Date   • ENDOSCOPY AND COLONOSCOPY     • EXTRACORPOREAL SHOCK WAVE LITHOTRIPSY (ESWL)     • INGUINAL HERNIA REPAIR     • TESTICLE SURGERY      removal x 1       SOCIAL HISTORY:    Social History     Socioeconomic History   • Marital status:      Spouse name: Not on file   • Number of children: Not on file   • Years of education: Not on file   • Highest education level: Not on file   Tobacco Use   • Smoking status: Former Smoker     Types: Cigarettes   • Smokeless tobacco: Never Used   • Tobacco comment: used to smoke approx 1 PPD for approx. 30 yrs.    Substance and Sexual Activity   • Alcohol use: No     Frequency: Never   • Drug use: No       FAMILY HISTORY:    Family History   Problem Relation Age of Onset   • No Known Problems Mother    • Stroke Father    • Heart attack Father    • Hypertension Father    • Hyperlipidemia Father    • Diabetes Father        Review of Systems   Constitutional: Positive for fatigue.   HENT: Negative.    Eyes: Positive for visual disturbance (glasses prn).   Respiratory: Positive for shortness of breath (occas.).    Cardiovascular: Positive for palpitations (occas.). Negative for chest pain and leg swelling.   Gastrointestinal: Positive for diarrhea.   Endocrine: Negative.    Genitourinary: Negative.    Musculoskeletal: Positive for arthralgias and myalgias.   Skin: Negative.    Allergic/Immunologic: Positive for environmental allergies.   Neurological: Negative.    Hematological: Bruises/bleeds easily.   Psychiatric/Behavioral: Positive for sleep disturbance.       VISIT  "VITALS:  Vitals:    12/08/20 0947   BP: 141/88   BP Location: Left arm   Patient Position: Sitting   Pulse: 86   SpO2: 97%   Weight: 94.5 kg (208 lb 6.4 oz)   Height: 177.8 cm (70\")      /88 (BP Location: Left arm, Patient Position: Sitting)   Pulse 86   Ht 177.8 cm (70\")   Wt 94.5 kg (208 lb 6.4 oz)   SpO2 97%   BMI 29.90 kg/m²     RECENT LABS:    Objective       Physical Exam  Vitals signs and nursing note reviewed.   Constitutional:       General: He is not in acute distress.     Appearance: He is well-developed.   HENT:      Head: Normocephalic and atraumatic.   Eyes:      Conjunctiva/sclera: Conjunctivae normal.      Pupils: Pupils are equal, round, and reactive to light.   Neck:      Musculoskeletal: Normal range of motion and neck supple.      Vascular: No carotid bruit, hepatojugular reflux or JVD.      Trachea: No tracheal deviation.      Comments: Nl. Carotid upstrokes  Cardiovascular:      Rate and Rhythm: Normal rate and regular rhythm.      Pulses:           Radial pulses are 2+ on the right side and 2+ on the left side.      Heart sounds: S1 normal and S2 normal. No murmur. No friction rub. Gallop present. S4 sounds present. No S3 sounds.    Pulmonary:      Effort: Pulmonary effort is normal.      Breath sounds: Normal breath sounds. No wheezing, rhonchi or rales.      Comments: Nl. Expir. Phase  Nl. Breath sound intensity  Abdominal:      General: Bowel sounds are normal. There is no distension or abdominal bruit.      Palpations: Abdomen is soft. There is no mass.      Tenderness: There is no abdominal tenderness. There is no guarding or rebound.      Comments: No organomegaly   Musculoskeletal: Normal range of motion.         General: No tenderness or deformity.      Left lower leg: Edema present.      Comments: LLE, trace edema at the sock line, palpable pedal pulses  RLE, no edema, sock indention, palpable pedal pulses   Skin:     General: Skin is warm and dry.      Coloration: Skin is " not pale.      Findings: No erythema or rash.   Neurological:      Mental Status: He is alert and oriented to person, place, and time.   Psychiatric:         Behavior: Behavior normal.         Thought Content: Thought content normal.         Judgment: Judgment normal.           ECG 12 Lead    Date/Time: 12/8/2020 10:35 AM  Performed by: Ghassan Bo MD  Authorized by: Ghassan Bo MD   Comparison: compared with previous ECG from 9/17/2019  Similar to previous ECG  Comments: Normal sinus rhythm at 72.              Assessment/Plan   #1.  Coronary artery disease.  The patient had nonhigh-grade LAD disease at prior cath.  Anthony has no symptoms of ischemia currently.  We will continue close clinical monitoring.    2.  Systemic hypertension.  Blood pressures are reasonably well controlled on low-dose therapy.    3.  Treated dyslipidemia.  Lipids are followed by Dr. Reyes.    4.  Exertional dyspnea.  I think that physical deconditioning is a major component of current symptoms as Jessica readily admits he is not physically active and is his wife states he spends the entire day in the recliner.  We discussed gradually progressive aerobic exercise.    5.  Mr. Sahu will follow with Dr. Reyes as instructed we will plan on seeing him in follow-up in 1 year or on a as needed basis as discussed.   Diagnosis Plan   1. Coronary artery disease due to calcified coronary lesion     2. Mixed hyperlipidemia     3. RAMONA on CPAP     4. Shortness of breath     5. Precordial pain     6. Former smoker         No follow-ups on file.         Anthony Sahu  reports that he has quit smoking. His smoking use included cigarettes. He has never used smokeless tobacco.. I have educated him on the risk of diseases from using tobacco products such as cancer, COPD and heart disease.       Advance Care Planning   ACP discussion was held with the patient during this visit. Patient does not have an advance directive, declines further  assistance.        Patient's Body mass index is 29.9 kg/m². BMI is above normal parameters. Recommendations include: educational material and referral to primary care.       Yuly Fish LPN    Scribed for Dr. Ghassan Bo by Yuly Fish LPN December 8, 2020 09:52 EST         Electronically signed by:            This note is dictated utilizing voice recognition software.  Although this record has been proof read, transcriptional errors may still be present. If questions occur regarding the content of this record please do not hesitate to call our office.

## 2021-06-08 ENCOUNTER — OFFICE VISIT (OUTPATIENT)
Dept: CARDIOLOGY | Facility: CLINIC | Age: 71
End: 2021-06-08

## 2021-06-08 VITALS
HEART RATE: 63 BPM | DIASTOLIC BLOOD PRESSURE: 78 MMHG | HEIGHT: 70 IN | BODY MASS INDEX: 30.29 KG/M2 | OXYGEN SATURATION: 96 % | WEIGHT: 211.6 LBS | SYSTOLIC BLOOD PRESSURE: 135 MMHG

## 2021-06-08 DIAGNOSIS — G47.33 OSA ON CPAP: ICD-10-CM

## 2021-06-08 DIAGNOSIS — Z99.89 OSA ON CPAP: ICD-10-CM

## 2021-06-08 DIAGNOSIS — E78.2 MIXED HYPERLIPIDEMIA: ICD-10-CM

## 2021-06-08 DIAGNOSIS — I10 ESSENTIAL HYPERTENSION: ICD-10-CM

## 2021-06-08 DIAGNOSIS — R68.89 DECREASED ACTIVITY TOLERANCE: ICD-10-CM

## 2021-06-08 DIAGNOSIS — I25.10 CORONARY ARTERY DISEASE DUE TO CALCIFIED CORONARY LESION: Primary | ICD-10-CM

## 2021-06-08 DIAGNOSIS — I25.84 CORONARY ARTERY DISEASE DUE TO CALCIFIED CORONARY LESION: Primary | ICD-10-CM

## 2021-06-08 DIAGNOSIS — R07.2 PRECORDIAL PAIN: ICD-10-CM

## 2021-06-08 DIAGNOSIS — Z87.891 FORMER SMOKER: ICD-10-CM

## 2021-06-08 DIAGNOSIS — R06.02 SHORTNESS OF BREATH: ICD-10-CM

## 2021-06-08 PROCEDURE — 99213 OFFICE O/P EST LOW 20 MIN: CPT | Performed by: INTERNAL MEDICINE

## 2021-06-08 NOTE — PROGRESS NOTES
Subjective   Anthony Sahu is a 70 y.o. male     Chief Complaint   Patient presents with   • Follow-up     Here for 6 mo. f/u   • Coronary Artery Disease   • Hyperlipidemia   • Sleep Apnea       PROBLEM LIST:     1. CAD  1.1 [facility], , demonstrated mod. Mid-LAD disease. EF 65%  1.2 Stress test, 9-17-19, no ischemia  2. Hyperlipidemia  3. RAMONA, uses c-pap, followed by Dr. Daniels.  4. Basal cell Carcinoma left ear  5. GERD  6. Shortness of breath  7. Former smoker  8. Echo, 9-17-19, grade 1 DD, trivial-mild TR, pulm. Pressures 30-35 mmHg  9. Palpitations  9.1 Event Monitor, 9-5-19 - 10-4-19, sinus, SB sleeping hors, occas. PVC'S      Specialty Problems        Cardiology Problems    Coronary artery disease due to calcified coronary lesion        Mixed hyperlipidemia                HPI:    Anthony returns for follow-up on the above.    He describes progressive class II-III exertional dyspnea over the last 1 to 2 years.  He has no orthopnea, PND, or significant lower extremity edema.    Anthony describes mild to moderate orthostatic lightheadedness but no presyncope or syncope.  He has no dizziness without positional change and he does not describe any change in very minimal palpitations currently.    Mr. Sahu is compliant with medications.  Blood pressures have been well controlled.  We have no recent fasting lipid profile data.                    PRIOR MEDICATIONS    Current Outpatient Medications on File Prior to Visit   Medication Sig Dispense Refill   • amLODIPine (NORVASC) 2.5 MG tablet Take 1 tablet by mouth Daily. 90 tablet 3   • aspirin 81 MG tablet Take 81 mg by mouth Daily.     • pravastatin (PRAVACHOL) 40 MG tablet TAKE ONE TABLET BY MOUTH DAILY 90 tablet 3     No current facility-administered medications on file prior to visit.       ALLERGIES:    Penicillins, Doxycycline, Keflex [cephalexin], and Meclizine    PAST MEDICAL HISTORY:    Past Medical History:   Diagnosis Date   • Cancer (CMS/HCC)     basal  "cell carcinoma left ear   • Chest pain    • GERD (gastroesophageal reflux disease)    • Hyperlipidemia    • Shortness of breath    • Sleep apnea     uses c-pap followed by Dr. waters.   • Stomach ulcer        SURGICAL HISTORY:    Past Surgical History:   Procedure Laterality Date   • ENDOSCOPY AND COLONOSCOPY     • EXTRACORPOREAL SHOCK WAVE LITHOTRIPSY (ESWL)     • INGUINAL HERNIA REPAIR     • TESTICLE SURGERY      removal x 1       SOCIAL HISTORY:    Social History     Socioeconomic History   • Marital status:      Spouse name: Not on file   • Number of children: Not on file   • Years of education: Not on file   • Highest education level: Not on file   Tobacco Use   • Smoking status: Former Smoker     Types: Cigarettes   • Smokeless tobacco: Never Used   • Tobacco comment: used to smoke approx 1 PPD for approx. 30 yrs.    Substance and Sexual Activity   • Alcohol use: No   • Drug use: No       FAMILY HISTORY:    Family History   Problem Relation Age of Onset   • No Known Problems Mother    • Stroke Father    • Heart attack Father    • Hypertension Father    • Hyperlipidemia Father    • Diabetes Father        Review of Systems   Constitutional: Negative.    HENT: Negative.    Eyes: Positive for visual disturbance (reading glasses).   Respiratory: Negative.    Cardiovascular: Positive for palpitations (occas. ). Negative for chest pain and leg swelling.   Gastrointestinal: Negative.    Endocrine: Negative.    Genitourinary: Negative.    Musculoskeletal: Negative.    Skin: Negative.    Allergic/Immunologic: Positive for environmental allergies.   Neurological: Positive for dizziness (occas. ). Negative for syncope.   Hematological: Bruises/bleeds easily.   Psychiatric/Behavioral: Negative.        VISIT VITALS:  Vitals:    06/08/21 0934   BP: 135/78   BP Location: Left arm   Patient Position: Sitting   Pulse: 63   SpO2: 96%   Weight: 96 kg (211 lb 9.6 oz)   Height: 177.8 cm (70\")      /78 (BP Location: " "Left arm, Patient Position: Sitting)   Pulse 63   Ht 177.8 cm (70\")   Wt 96 kg (211 lb 9.6 oz)   SpO2 96%   BMI 30.36 kg/m²     RECENT LABS:    Objective       Physical Exam  Vitals and nursing note reviewed.   Constitutional:       General: He is not in acute distress.     Appearance: He is well-developed.   HENT:      Head: Normocephalic and atraumatic.   Eyes:      Conjunctiva/sclera: Conjunctivae normal.      Pupils: Pupils are equal, round, and reactive to light.   Neck:      Vascular: No carotid bruit, hepatojugular reflux or JVD.      Trachea: No tracheal deviation.      Comments: Nl. Carotid upstrokes  Cardiovascular:      Rate and Rhythm: Normal rate and regular rhythm.      Pulses:           Radial pulses are 2+ on the right side and 2+ on the left side.      Heart sounds: S1 normal and S2 normal. No murmur heard.   No friction rub. Gallop present. S4 sounds present. No S3 sounds.    Pulmonary:      Effort: Pulmonary effort is normal.      Breath sounds: Normal breath sounds. No wheezing, rhonchi or rales.      Comments: Nl. Expir. Phase  Nl. Breath sound intensity  Abdominal:      General: Bowel sounds are normal. There is no distension or abdominal bruit.      Palpations: Abdomen is soft. There is no mass.      Tenderness: There is no abdominal tenderness. There is no guarding or rebound.      Comments: No organomegaly   Musculoskeletal:         General: No tenderness or deformity. Normal range of motion.      Cervical back: Normal range of motion and neck supple.      Right lower leg: Edema present.      Left lower leg: Edema present.      Comments: LLE, trace edema at sock line, palpable pedal pulses  RLE, trace-1+ edema at sock line, palpable pedal pulses   Skin:     General: Skin is warm and dry.      Coloration: Skin is not pale.      Findings: No erythema or rash.   Neurological:      Mental Status: He is alert and oriented to person, place, and time.   Psychiatric:         Behavior: Behavior " normal.         Thought Content: Thought content normal.         Judgment: Judgment normal.         Procedures      Assessment/Plan   #1.  Progressive exertional dyspnea.  Mr. Sahu is very concerned this may be an anginal equivalent.  Given known coronary artery disease I would like to repeat pharmacologic stress testing for risk stratification.  We will also obtain an echocardiogram to assess LV filling pressures LV systolic function and pulmonary pressures as well as to exclude constrictive or restrictive physiology.    2.  Coronary artery disease.  Anthony had 50 to 60% proximal LAD stenosis at cardiac catheterization in 2010.  Pharmacologic stress in 2019 demonstrated no evidence of ischemia.  Therefore, stress testing as described above should be hopeful and defining or excluding ischemia as a cause.    3.  Treated and controlled systemic hypertension.    4.  Treated dyslipidemia.    5.  Mr. Sahu will follow with Dr. Reyes as instructed with further recommendations from our office based on findings of upcoming testing.   Diagnosis Plan   1. Coronary artery disease due to calcified coronary lesion     2. Mixed hyperlipidemia     3. Precordial pain     4. Shortness of breath     5. RAMONA on CPAP     6. Former smoker         No follow-ups on file.         Anthony Sahu  reports that he has quit smoking. His smoking use included cigarettes. He has never used smokeless tobacco.. I have educated him on the risk of diseases from using tobacco products such as cancer, COPD and heart disease.       ACP discussion was held with the patient during this visit. Patient does not have an advance directive, declines further assistance.        Patient's Body mass index is 30.36 kg/m². indicating that he is obese (BMI >30). Obesity-related health conditions include the following: obstructive sleep apnea, coronary heart disease, diabetes mellitus and GERD. Obesity is to be assessed at today's visit. BMI is pcp  addressing. We discussed portion control and increasing exercise..       Yuly Fish LPN    Scribed for Dr. Ghassan Bo by Yuly Fish LPN June 8, 2021 09:37 EDT         Electronically signed by:            This note is dictated utilizing voice recognition software.  Although this record has been proof read, transcriptional errors may still be present. If questions occur regarding the content of this record please do not hesitate to call our office.

## 2021-06-08 NOTE — PATIENT INSTRUCTIONS
Obesity, Adult  Obesity is the condition of having too much total body fat. Being overweight or obese means that your weight is greater than what is considered healthy for your body size. Obesity is determined by a measurement called BMI. BMI is an estimate of body fat and is calculated from height and weight. For adults, a BMI of 30 or higher is considered obese.  Obesity can lead to other health concerns and major illnesses, including:  · Stroke.  · Coronary artery disease (CAD).  · Type 2 diabetes.  · Some types of cancer, including cancers of the colon, breast, uterus, and gallbladder.  · Osteoarthritis.  · High blood pressure (hypertension).  · High cholesterol.  · Sleep apnea.  · Gallbladder stones.  · Infertility problems.  What are the causes?  Common causes of this condition include:  · Eating daily meals that are high in calories, sugar, and fat.  · Being born with genes that may make you more likely to become obese.  · Having a medical condition that causes obesity, including:  ? Hypothyroidism.  ? Polycystic ovarian syndrome (PCOS).  ? Binge-eating disorder.  ? Cushing syndrome.  · Taking certain medicines, such as steroids, antidepressants, and seizure medicines.  · Not being physically active (sedentary lifestyle).  · Not getting enough sleep.  · Drinking high amounts of sugar-sweetened beverages, such as soft drinks.  What increases the risk?  The following factors may make you more likely to develop this condition:  · Having a family history of obesity.  · Being a woman of  descent.  · Being a man of  descent.  · Living in an area with limited access to:  ? [place], recreation centers, or sidewalks.  ? Healthy food choices, such as grocery stores and farmers' markets.  What are the signs or symptoms?  The main sign of this condition is having too much body fat.  How is this diagnosed?  This condition is diagnosed based on:  · Your BMI. If you are an adult with a BMI of 30 or  higher, you are considered obese.  · Your waist circumference. This measures the distance around your waistline.  · Your skinfold thickness. Your health care provider may gently pinch a fold of your skin and measure it.  You may have other tests to check for underlying conditions.  How is this treated?  Treatment for this condition often includes changing your lifestyle. Treatment may include some or all of the following:  · Dietary changes. This may include developing a healthy meal plan.  · Regular physical activity. This may include activity that causes your heart to beat faster (aerobic exercise) and strength training. Work with your health care provider to design an exercise program that works for you.  · Medicine to help you lose weight if you are unable to lose 1 pound a week after 6 weeks of healthy eating and more physical activity.  · Treating conditions that cause the obesity (underlying conditions).  · Surgery. Surgical options may include gastric banding and gastric bypass. Surgery may be done if:  ? Other treatments have not helped to improve your condition.  ? You have a BMI of 40 or higher.  ? You have life-threatening health problems related to obesity.  Follow these instructions at home:  Eating and drinking    · Follow recommendations from your health care provider about what you eat and drink. Your health care provider may advise you to:  ? Limit fast food, sweets, and processed snack foods.  ? Choose low-fat options, such as low-fat milk instead of whole milk.  ? Eat 5 or more servings of fruits or vegetables every day.  ? Eat at home more often. This gives you more control over what you eat.  ? Choose healthy foods when you eat out.  ? Learn to read food labels. This will help you understand how much food is considered 1 serving.  ? Learn what a healthy serving size is.  ? Keep low-fat snacks available.  ? Limit sugary drinks, such as soda, fruit juice, sweetened iced tea, and flavored  milk.  · Drink enough water to keep your urine pale yellow.  · Do not follow a fad diet. Fad diets can be unhealthy and even dangerous.  Physical activity  · Exercise regularly, as told by your health care provider.  ? Most adults should get up to 150 minutes of moderate-intensity exercise every week.  ? Ask your health care provider what types of exercise are safe for you and how often you should exercise.  · Warm up and stretch before being active.  · Cool down and stretch after being active.  · Rest between periods of activity.  Lifestyle  · Work with your health care provider and a dietitian to set a weight-loss goal that is healthy and reasonable for you.  · Limit your screen time.  · Find ways to reward yourself that do not involve food.  · Do not drink alcohol if:  ? Your health care provider tells you not to drink.  ? You are pregnant, may be pregnant, or are planning to become pregnant.  · If you drink alcohol:  ? Limit how much you use to:  § 0-1 drink a day for women.  § 0-2 drinks a day for men.  ? Be aware of how much alcohol is in your drink. In the [place] one drink equals one 12 oz bottle of beer (355 mL), one 5 oz glass of wine (148 mL), or one 1½ oz glass of hard liquor (44 mL).  General instructions  · Keep a weight-loss journal to keep track of the food you eat and how much exercise you get.  · Take over-the-counter and prescription medicines only as told by your health care provider.  · Take vitamins and supplements only as told by your health care provider.  · Consider joining a support group. Your health care provider may be able to recommend a support group.  · Keep all follow-up visits as told by your health care provider. This is important.  Contact a health care provider if:  · You are unable to meet your weight loss goal after 6 weeks of dietary and lifestyle changes.  Get help right away if you are having:  · Trouble breathing.  · Suicidal thoughts or behaviors.  Summary  · Obesity is the  condition of having too much total body fat.  · Being overweight or obese means that your weight is greater than what is considered healthy for your body size.  · Work with your health care provider and a dietitian to set a weight-loss goal that is healthy and reasonable for you.  · Exercise regularly, as told by your health care provider. Ask your health care provider what types of exercise are safe for you and how often you should exercise.  This information is not intended to replace advice given to you by your health care provider. Make sure you discuss any questions you have with your health care provider.  Document Revised: 08/22/2019 Document Reviewed: 08/22/2019  [employer] Patient Education © 2021 [employer]  MyPlate from [employer]    MyPlate is an outline of a general healthy diet based on the 2010 Dietary Guidelines for Americans, from the [place] Department of Agriculture (USDA). It sets guidelines for how much food you should eat from each food group based on your age, sex, and level of physical activity.  What are tips for following MyPlate?  To follow MyPlate recommendations:  · Eat a wide variety of fruits and vegetables, grains, and protein foods.  · Serve smaller portions and eat less food throughout the day.  · Limit portion sizes to avoid overeating.  · Enjoy your food.  · Get at least 150 minutes of exercise every week. This is about 30 minutes each day, 5 or more days per week.  It can be difficult to have every meal look like MyPlate. Think about MyPlate as eating guidelines for an entire day, rather than each individual meal.  Fruits and vegetables  · Make half of your plate fruits and vegetables.  · Eat many different colors of fruits and vegetables each day.  · For a 2,000 calorie daily food plan, eat:  ? 2½ cups of vegetables every day.  ? 2 cups of fruit every day.  · 1 cup is equal to:  ? 1 cup raw or cooked vegetables.  ? 1 cup raw fruit.  ? 1 medium-sized orange, apple, or banana.  ? 1 cup  100% fruit or vegetable juice.  ? 2 cups raw leafy greens, such as lettuce, spinach, or kale.  ? ½ cup dried fruit.  Grains  · One fourth of your plate should be grains.  · Make at least half of the grains you eat each day whole grains.  · For a 2,000 calorie daily food plan, eat 6 oz of grains every day.  · 1 oz is equal to:  ? 1 slice bread.  ? 1 cup cereal.  ? ½ cup cooked rice, cereal, or pasta.  Protein  · One fourth of your plate should be protein.  · Eat a wide variety of protein foods, including meat, poultry, fish, eggs, beans, nuts, and tofu.  · For a 2,000 calorie daily food plan, eat 5½ oz of protein every day.  · 1 oz is equal to:  ? 1 oz meat, poultry, or fish.  ? ¼ cup cooked beans.  ? 1 egg.  ? ½ oz nuts or seeds.  ? 1 Tbsp peanut butter.  Dairy  · Drink fat-free or low-fat (1%) milk.  · Eat or drink dairy as a side to meals.  · For a 2,000 calorie daily food plan, eat or drink 3 cups of dairy every day.  · 1 cup is equal to:  ? 1 cup milk, yogurt, cottage cheese, or soy milk (soy beverage).  ? 2 oz processed cheese.  ? 1½ oz natural cheese.  Fats, oils, salt, and sugars  · Only small amounts of oils are recommended.  · Avoid foods that are high in calories and low in nutritional value (empty calories), like foods high in fat or added sugars.  · Choose foods that are low in salt (sodium). Choose foods that have less than 140 milligrams (mg) of sodium per serving.  · Drink water instead of sugary drinks. Drink enough water each day to keep your urine pale yellow.  Where to find support  · Work with your health care provider or a nutrition specialist (dietitian) to develop a customized eating plan that is right for you.  · Download an jonathan (mobile application) to help you track your daily food intake.  Where to find more information  · Go to ChooseMyPlate.gov for more information.  Summary  · MyPlate is a general guideline for healthy eating from the USDA. It is based on the 2010 Dietary Guidelines for  Americans.  · In general, fruits and vegetables should take up ½ of your plate, grains should take up ¼ of your plate, and protein should take up ¼ of your plate.  This information is not intended to replace advice given to you by your health care provider. Make sure you discuss any questions you have with your health care provider.  Document Revised: 05/21/2020 Document Reviewed: 03/19/2018  [employer] Patient Education © 2021 [employer]

## 2021-08-03 ENCOUNTER — HOSPITAL ENCOUNTER (OUTPATIENT)
Dept: CARDIOLOGY | Facility: HOSPITAL | Age: 71
Discharge: HOME | End: 2021-08-03

## 2021-08-03 VITALS — WEIGHT: 211.64 LBS | BODY MASS INDEX: 30.3 KG/M2 | HEIGHT: 70 IN

## 2021-08-03 DIAGNOSIS — I10 ESSENTIAL HYPERTENSION: ICD-10-CM

## 2021-08-03 DIAGNOSIS — R68.89 DECREASED ACTIVITY TOLERANCE: ICD-10-CM

## 2021-08-03 DIAGNOSIS — R06.02 SHORTNESS OF BREATH: ICD-10-CM

## 2021-08-03 DIAGNOSIS — I25.84 CORONARY ARTERY DISEASE DUE TO CALCIFIED CORONARY LESION: ICD-10-CM

## 2021-08-03 DIAGNOSIS — I25.10 CORONARY ARTERY DISEASE DUE TO CALCIFIED CORONARY LESION: ICD-10-CM

## 2021-08-03 DIAGNOSIS — E78.2 MIXED HYPERLIPIDEMIA: ICD-10-CM

## 2021-08-03 PROCEDURE — 93017 CV STRESS TEST TRACING ONLY: CPT

## 2021-08-03 PROCEDURE — 93306 TTE W/DOPPLER COMPLETE: CPT

## 2021-08-03 PROCEDURE — 93018 CV STRESS TEST I&R ONLY: CPT | Performed by: INTERNAL MEDICINE

## 2021-08-03 PROCEDURE — A9500 TC99M SESTAMIBI: HCPCS | Performed by: INTERNAL MEDICINE

## 2021-08-03 PROCEDURE — 0 TECHNETIUM SESTAMIBI: Performed by: INTERNAL MEDICINE

## 2021-08-03 PROCEDURE — 78452 HT MUSCLE IMAGE SPECT MULT: CPT | Performed by: INTERNAL MEDICINE

## 2021-08-03 PROCEDURE — 93306 TTE W/DOPPLER COMPLETE: CPT | Performed by: INTERNAL MEDICINE

## 2021-08-03 PROCEDURE — 78452 HT MUSCLE IMAGE SPECT MULT: CPT

## 2021-08-03 PROCEDURE — 25010000002 REGADENOSON 0.4 MG/5ML SOLUTION: Performed by: INTERNAL MEDICINE

## 2021-08-03 RX ADMIN — TECHNETIUM TC 99M SESTAMIBI 1 DOSE: 1 INJECTION INTRAVENOUS at 08:11

## 2021-08-03 RX ADMIN — TECHNETIUM TC 99M SESTAMIBI 1 DOSE: 1 INJECTION INTRAVENOUS at 09:58

## 2021-08-03 RX ADMIN — REGADENOSON 0.4 MG: 0.08 INJECTION, SOLUTION INTRAVENOUS at 09:58

## 2021-08-05 ENCOUNTER — TELEPHONE (OUTPATIENT)
Dept: CARDIOLOGY | Facility: CLINIC | Age: 71
End: 2021-08-05

## 2021-08-05 LAB
BH CV REST NUCLEAR ISOTOPE DOSE: 10 MCI
BH CV STRESS COMMENTS STAGE 1: NORMAL
BH CV STRESS DOSE REGADENOSON STAGE 1: 0.4
BH CV STRESS DURATION MIN STAGE 1: 0
BH CV STRESS DURATION SEC STAGE 1: 10
BH CV STRESS NUCLEAR ISOTOPE DOSE: 30 MCI
BH CV STRESS PROTOCOL 1: NORMAL
BH CV STRESS RECOVERY BP: NORMAL MMHG
BH CV STRESS RECOVERY HR: 70 BPM
BH CV STRESS STAGE 1: 1
MAXIMAL PREDICTED HEART RATE: 148 BPM
PERCENT MAX PREDICTED HR: 60.14 %
STRESS BASELINE BP: NORMAL MMHG
STRESS BASELINE HR: 58 BPM
STRESS PERCENT HR: 71 %
STRESS POST PEAK BP: NORMAL MMHG
STRESS POST PEAK HR: 89 BPM
STRESS TARGET HR: 126 BPM

## 2021-08-05 NOTE — TELEPHONE ENCOUNTER
STRESS TEST RESULTS DISCUSSED WITH MR. LUU. LE,LUIS            ----- Message from Ghassan Bo MD sent at 8/5/2021 12:50 PM EDT -----  Routine f/u.

## 2021-08-22 LAB
AORTIC DIMENSIONLESS INDEX: 1 (DI)
BH CV ECHO MEAS - ACS: 1.9 CM
BH CV ECHO MEAS - AO MAX PG (FULL): -0.27 MMHG
BH CV ECHO MEAS - AO MAX PG: 4.8 MMHG
BH CV ECHO MEAS - AO MEAN PG (FULL): 0 MMHG
BH CV ECHO MEAS - AO MEAN PG: 2 MMHG
BH CV ECHO MEAS - AO ROOT AREA (BSA CORRECTED): 1.7
BH CV ECHO MEAS - AO ROOT AREA: 10.2 CM^2
BH CV ECHO MEAS - AO ROOT DIAM: 3.6 CM
BH CV ECHO MEAS - AO V2 MAX: 110 CM/SEC
BH CV ECHO MEAS - AO V2 MEAN: 69.5 CM/SEC
BH CV ECHO MEAS - AO V2 VTI: 24.2 CM
BH CV ECHO MEAS - BSA(HAYCOCK): 2.2 M^2
BH CV ECHO MEAS - BSA: 2.1 M^2
BH CV ECHO MEAS - BZI_BMI: 30.3 KILOGRAMS/M^2
BH CV ECHO MEAS - BZI_METRIC_HEIGHT: 177.8 CM
BH CV ECHO MEAS - BZI_METRIC_WEIGHT: 95.7 KG
BH CV ECHO MEAS - EDV(CUBED): 72 ML
BH CV ECHO MEAS - EDV(TEICH): 76.8 ML
BH CV ECHO MEAS - EF(CUBED): 65.1 %
BH CV ECHO MEAS - EF(MOD-BP): 57 %
BH CV ECHO MEAS - EF(TEICH): 57 %
BH CV ECHO MEAS - ESV(CUBED): 25.2 ML
BH CV ECHO MEAS - ESV(TEICH): 33 ML
BH CV ECHO MEAS - FS: 29.6 %
BH CV ECHO MEAS - IVS/LVPW: 1.1
BH CV ECHO MEAS - IVSD: 1.1 CM
BH CV ECHO MEAS - LA DIMENSION: 3.5 CM
BH CV ECHO MEAS - LA/AO: 0.97
BH CV ECHO MEAS - LAT PEAK E' VEL: 11 CM/SEC
BH CV ECHO MEAS - LV IVRT: 0.11 SEC
BH CV ECHO MEAS - LV MASS(C)D: 138.1 GRAMS
BH CV ECHO MEAS - LV MASS(C)DI: 64.7 GRAMS/M^2
BH CV ECHO MEAS - LV MAX PG: 5.1 MMHG
BH CV ECHO MEAS - LV MEAN PG: 2 MMHG
BH CV ECHO MEAS - LV V1 MAX: 113 CM/SEC
BH CV ECHO MEAS - LV V1 MEAN: 65.9 CM/SEC
BH CV ECHO MEAS - LV V1 VTI: 26.7 CM
BH CV ECHO MEAS - LVIDD: 4.2 CM
BH CV ECHO MEAS - LVIDS: 2.9 CM
BH CV ECHO MEAS - LVPWD: 0.95 CM
BH CV ECHO MEAS - MED PEAK E' VEL: 6.6 CM/SEC
BH CV ECHO MEAS - MV A MAX VEL: 51.2 CM/SEC
BH CV ECHO MEAS - MV DEC SLOPE: 352 CM/SEC^2
BH CV ECHO MEAS - MV DEC TIME: 0.31 SEC
BH CV ECHO MEAS - MV E MAX VEL: 75 CM/SEC
BH CV ECHO MEAS - MV E/A: 1.5
BH CV ECHO MEAS - MV MAX PG: 3.9 MMHG
BH CV ECHO MEAS - MV MEAN PG: 2 MMHG
BH CV ECHO MEAS - MV P1/2T MAX VEL: 101 CM/SEC
BH CV ECHO MEAS - MV P1/2T: 84 MSEC
BH CV ECHO MEAS - MV V2 MAX: 99 CM/SEC
BH CV ECHO MEAS - MV V2 MEAN: 56.7 CM/SEC
BH CV ECHO MEAS - MV V2 VTI: 35.4 CM
BH CV ECHO MEAS - MVA P1/2T LCG: 2.2 CM^2
BH CV ECHO MEAS - MVA(P1/2T): 2.6 CM^2
BH CV ECHO MEAS - PA MAX PG (FULL): 5.2 MMHG
BH CV ECHO MEAS - PA MAX PG: 10.1 MMHG
BH CV ECHO MEAS - PA MEAN PG (FULL): 3 MMHG
BH CV ECHO MEAS - PA MEAN PG: 5 MMHG
BH CV ECHO MEAS - PA V2 MAX: 159 CM/SEC
BH CV ECHO MEAS - PA V2 MEAN: 96.8 CM/SEC
BH CV ECHO MEAS - PA V2 VTI: 38.8 CM
BH CV ECHO MEAS - RAP SYSTOLE: 10 MMHG
BH CV ECHO MEAS - RV MAX PG: 4.9 MMHG
BH CV ECHO MEAS - RV MEAN PG: 2 MMHG
BH CV ECHO MEAS - RV V1 MAX: 111 CM/SEC
BH CV ECHO MEAS - RV V1 MEAN: 68 CM/SEC
BH CV ECHO MEAS - RV V1 VTI: 28.4 CM
BH CV ECHO MEAS - RVDD: 3.3 CM
BH CV ECHO MEAS - RVSP: 32 MMHG
BH CV ECHO MEAS - SI(AO): 115.4 ML/M^2
BH CV ECHO MEAS - SI(CUBED): 21.9 ML/M^2
BH CV ECHO MEAS - SI(TEICH): 20.5 ML/M^2
BH CV ECHO MEAS - SV(AO): 246.3 ML
BH CV ECHO MEAS - SV(CUBED): 46.8 ML
BH CV ECHO MEAS - SV(TEICH): 43.8 ML
BH CV ECHO MEAS - TR MAX VEL: 234 CM/SEC
BH CV ECHO MEASUREMENTS AVERAGE E/E' RATIO: 8.52
MAXIMAL PREDICTED HEART RATE: 148 BPM
SINUS: 3.7 CM
STRESS TARGET HR: 126 BPM

## 2021-08-23 ENCOUNTER — TELEPHONE (OUTPATIENT)
Dept: CARDIOLOGY | Facility: CLINIC | Age: 71
End: 2021-08-23

## 2021-08-23 NOTE — TELEPHONE ENCOUNTER
ECHO RESULTS BRIEFLY DISCUSSED WITH MR. LUU. LE,SHIRLEYN          ----- Message from Ghassan Bo MD sent at 8/23/2021  8:40 AM EDT -----  Routine f/u.

## 2021-09-13 ENCOUNTER — TELEPHONE (OUTPATIENT)
Dept: CARDIOLOGY | Facility: CLINIC | Age: 71
End: 2021-09-13

## 2021-09-13 DIAGNOSIS — I10 ESSENTIAL HYPERTENSION: ICD-10-CM

## 2021-09-13 RX ORDER — AMLODIPINE BESYLATE 2.5 MG/1
2.5 TABLET ORAL DAILY
Qty: 90 TABLET | Refills: 3 | Status: SHIPPED | OUTPATIENT
Start: 2021-09-13 | End: 2022-10-25 | Stop reason: SDUPTHER

## 2021-10-19 ENCOUNTER — OFFICE VISIT (OUTPATIENT)
Dept: CARDIOLOGY | Facility: CLINIC | Age: 71
End: 2021-10-19

## 2021-10-19 VITALS
WEIGHT: 208.8 LBS | SYSTOLIC BLOOD PRESSURE: 120 MMHG | BODY MASS INDEX: 29.89 KG/M2 | HEART RATE: 62 BPM | OXYGEN SATURATION: 95 % | HEIGHT: 70 IN | DIASTOLIC BLOOD PRESSURE: 69 MMHG

## 2021-10-19 DIAGNOSIS — Z99.89 OSA ON CPAP: ICD-10-CM

## 2021-10-19 DIAGNOSIS — G47.33 OSA ON CPAP: ICD-10-CM

## 2021-10-19 DIAGNOSIS — I25.10 CORONARY ARTERY DISEASE DUE TO CALCIFIED CORONARY LESION: ICD-10-CM

## 2021-10-19 DIAGNOSIS — R06.02 SHORTNESS OF BREATH: ICD-10-CM

## 2021-10-19 DIAGNOSIS — I25.84 CORONARY ARTERY DISEASE DUE TO CALCIFIED CORONARY LESION: ICD-10-CM

## 2021-10-19 DIAGNOSIS — R07.2 PRECORDIAL PAIN: Primary | ICD-10-CM

## 2021-10-19 DIAGNOSIS — Z87.891 FORMER SMOKER: ICD-10-CM

## 2021-10-19 DIAGNOSIS — E78.2 MIXED HYPERLIPIDEMIA: ICD-10-CM

## 2021-10-19 PROCEDURE — 99213 OFFICE O/P EST LOW 20 MIN: CPT | Performed by: INTERNAL MEDICINE

## 2021-10-19 RX ORDER — CHOLECALCIFEROL (VITAMIN D3) 125 MCG
CAPSULE ORAL 2 TIMES DAILY
COMMUNITY
End: 2022-10-25 | Stop reason: ALTCHOICE

## 2021-10-19 RX ORDER — ONDANSETRON 4 MG/1
TABLET, FILM COATED ORAL
COMMUNITY
Start: 2021-09-01 | End: 2023-01-17

## 2021-10-19 RX ORDER — ZINC GLUCONATE 50 MG
TABLET ORAL DAILY
COMMUNITY
End: 2022-10-25 | Stop reason: ALTCHOICE

## 2021-10-19 RX ORDER — MULTIVIT WITH MINERALS/LUTEIN
1000 TABLET ORAL DAILY
COMMUNITY
End: 2022-10-25 | Stop reason: ALTCHOICE

## 2021-10-19 RX ORDER — PANTOPRAZOLE SODIUM 40 MG/1
TABLET, DELAYED RELEASE ORAL DAILY
COMMUNITY
Start: 2021-08-05

## 2021-10-19 NOTE — PATIENT INSTRUCTIONS
Obesity, Adult  Obesity is the condition of having too much total body fat. Being overweight or obese means that your weight is greater than what is considered healthy for your body size. Obesity is determined by a measurement called BMI. BMI is an estimate of body fat and is calculated from height and weight. For adults, a BMI of 30 or higher is considered obese.  Obesity can lead to other health concerns and major illnesses, including:  · Stroke.  · Coronary artery disease (CAD).  · Type 2 diabetes.  · Some types of cancer, including cancers of the colon, breast, uterus, and gallbladder.  · Osteoarthritis.  · High blood pressure (hypertension).  · High cholesterol.  · Sleep apnea.  · Gallbladder stones.  · Infertility problems.  What are the causes?  Common causes of this condition include:  · Eating daily meals that are high in calories, sugar, and fat.  · Being born with genes that may make you more likely to become obese.  · Having a medical condition that causes obesity, including:  ? Hypothyroidism.  ? Polycystic ovarian syndrome (PCOS).  ? Binge-eating disorder.  ? Cushing syndrome.  · Taking certain medicines, such as steroids, antidepressants, and seizure medicines.  · Not being physically active (sedentary lifestyle).  · Not getting enough sleep.  · Drinking high amounts of sugar-sweetened beverages, such as soft drinks.  What increases the risk?  The following factors may make you more likely to develop this condition:  · Having a family history of obesity.  · Being a woman of  descent.  · Being a man of  descent.  · Living in an area with limited access to:  ? [place], recreation centers, or sidewalks.  ? Healthy food choices, such as grocery stores and farmers' markets.  What are the signs or symptoms?  The main sign of this condition is having too much body fat.  How is this diagnosed?  This condition is diagnosed based on:  · Your BMI. If you are an adult with a BMI of 30 or  higher, you are considered obese.  · Your waist circumference. This measures the distance around your waistline.  · Your skinfold thickness. Your health care provider may gently pinch a fold of your skin and measure it.  You may have other tests to check for underlying conditions.  How is this treated?  Treatment for this condition often includes changing your lifestyle. Treatment may include some or all of the following:  · Dietary changes. This may include developing a healthy meal plan.  · Regular physical activity. This may include activity that causes your heart to beat faster (aerobic exercise) and strength training. Work with your health care provider to design an exercise program that works for you.  · Medicine to help you lose weight if you are unable to lose 1 pound a week after 6 weeks of healthy eating and more physical activity.  · Treating conditions that cause the obesity (underlying conditions).  · Surgery. Surgical options may include gastric banding and gastric bypass. Surgery may be done if:  ? Other treatments have not helped to improve your condition.  ? You have a BMI of 40 or higher.  ? You have life-threatening health problems related to obesity.  Follow these instructions at home:  Eating and drinking    · Follow recommendations from your health care provider about what you eat and drink. Your health care provider may advise you to:  ? Limit fast food, sweets, and processed snack foods.  ? Choose low-fat options, such as low-fat milk instead of whole milk.  ? Eat 5 or more servings of fruits or vegetables every day.  ? Eat at home more often. This gives you more control over what you eat.  ? Choose healthy foods when you eat out.  ? Learn to read food labels. This will help you understand how much food is considered 1 serving.  ? Learn what a healthy serving size is.  ? Keep low-fat snacks available.  ? Limit sugary drinks, such as soda, fruit juice, sweetened iced tea, and flavored  milk.  · Drink enough water to keep your urine pale yellow.  · Do not follow a fad diet. Fad diets can be unhealthy and even dangerous.    Physical activity  · Exercise regularly, as told by your health care provider.  ? Most adults should get up to 150 minutes of moderate-intensity exercise every week.  ? Ask your health care provider what types of exercise are safe for you and how often you should exercise.  · Warm up and stretch before being active.  · Cool down and stretch after being active.  · Rest between periods of activity.  Lifestyle  · Work with your health care provider and a dietitian to set a weight-loss goal that is healthy and reasonable for you.  · Limit your screen time.  · Find ways to reward yourself that do not involve food.  · Do not drink alcohol if:  ? Your health care provider tells you not to drink.  ? You are pregnant, may be pregnant, or are planning to become pregnant.  · If you drink alcohol:  ? Limit how much you use to:  § 0-1 drink a day for women.  § 0-2 drinks a day for men.  ? Be aware of how much alcohol is in your drink. In the [place] one drink equals one 12 oz bottle of beer (355 mL), one 5 oz glass of wine (148 mL), or one 1½ oz glass of hard liquor (44 mL).  General instructions  · Keep a weight-loss journal to keep track of the food you eat and how much exercise you get.  · Take over-the-counter and prescription medicines only as told by your health care provider.  · Take vitamins and supplements only as told by your health care provider.  · Consider joining a support group. Your health care provider may be able to recommend a support group.  · Keep all follow-up visits as told by your health care provider. This is important.  Contact a health care provider if:  · You are unable to meet your weight loss goal after 6 weeks of dietary and lifestyle changes.  Get help right away if you are having:  · Trouble breathing.  · Suicidal thoughts or behaviors.  Summary  · Obesity is  the condition of having too much total body fat.  · Being overweight or obese means that your weight is greater than what is considered healthy for your body size.  · Work with your health care provider and a dietitian to set a weight-loss goal that is healthy and reasonable for you.  · Exercise regularly, as told by your health care provider. Ask your health care provider what types of exercise are safe for you and how often you should exercise.  This information is not intended to replace advice given to you by your health care provider. Make sure you discuss any questions you have with your health care provider.  Document Revised: 08/22/2019 Document Reviewed: 08/22/2019  [employer] Patient Education © 2021 [employer]  BMI for Adults  What is BMI?  Body mass index (BMI) is a number that is calculated from a person's weight and height. BMI can help estimate how much of a person's weight is composed of fat. BMI does not measure body fat directly. Rather, it is an alternative to procedures that directly measure body fat, which can be difficult and expensive.  BMI can help identify people who may be at higher risk for certain medical problems.  What are BMI measurements used for?  BMI is used as a screening tool to identify possible weight problems. It helps determine whether a person is obese, overweight, a healthy weight, or underweight.  BMI is useful for:  · Identifying a weight problem that may be related to a medical condition or may increase the risk for medical problems.  · Promoting changes, such as changes in diet and exercise, to help reach a healthy weight. BMI screening can be repeated to see if these changes are working.  How is BMI calculated?  BMI involves measuring your weight in relation to your height. Both height and weight are measured, and the BMI is calculated from those numbers. This can be done either in English ([place] or metric measurements. Note that charts and online BMI calculators are  "available to help you find your BMI quickly and easily without having to do these calculations yourself.  To calculate your BMI in English ([place] measurements:    1. Measure your weight in pounds (lb).  2. Multiply the number of pounds by 703.  ? For example, for a person who weighs 180 lb, multiply that number by 703, which equals 126,540.  3. Measure your height in inches. Then multiply that number by itself to get a measurement called \"inches squared.\"  ? For example, for a person who is 70 inches tall, the \"inches squared\" measurement is 70 inches x 70 inches, which equals 4,900 inches squared.  4. Divide the total from step 2 (number of lb x 703) by the total from step 3 (inches squared): 126,540 ÷ 4,900 = 25.8. This is your BMI.    To calculate your BMI in metric measurements:  1. Measure your weight in kilograms (kg).  2. Measure your height in meters (m). Then multiply that number by itself to get a measurement called \"meters squared.\"  ? For example, for a person who is 1.75 m tall, the \"meters squared\" measurement is 1.75 m x 1.75 m, which is equal to 3.1 meters squared.  3. Divide the number of kilograms (your weight) by the meters squared number. In this example: 70 ÷ 3.1 = 22.6. This is your BMI.  What do the results mean?  BMI charts are used to identify whether you are underweight, normal weight, overweight, or obese. The following guidelines will be used:  · Underweight: BMI less than 18.5.  · Normal weight: BMI between 18.5 and 24.9.  · Overweight: BMI between 25 and 29.9.  · Obese: BMI of 30 or above.  Keep these notes in mind:  · Weight includes both fat and muscle, so someone with a muscular build, such as an athlete, may have a BMI that is higher than 24.9. In cases like these, BMI is not an accurate measure of body fat.  · To determine if excess body fat is the cause of a BMI of 25 or higher, further assessments may need to be done by a health care provider.  · BMI is usually interpreted in the " same way for men and women.  Where to find more information  For more information about BMI, including tools to quickly calculate your BMI, go to these websites:  · Centers for Disease Control and Prevention: www.cdc.gov  · American Heart Association: www.heart.org  · National Heart, Lung, and Blood [place]: www.nhlbi.nih.gov  Summary  · Body mass index (BMI) is a number that is calculated from a person's weight and height.  · BMI may help estimate how much of a person's weight is composed of fat. BMI can help identify those who may be at higher risk for certain medical problems.  · BMI can be measured using English measurements or metric measurements.  · BMI charts are used to identify whether you are underweight, normal weight, overweight, or obese.  This information is not intended to replace advice given to you by your health care provider. Make sure you discuss any questions you have with your health care provider.  Document Revised: 09/09/2020 Document Reviewed: 07/17/2020  [employer] Patient Education © 2021 [employer]

## 2021-10-19 NOTE — PROGRESS NOTES
Subjective   Anthony Sahu is a 70 y.o. male     Chief Complaint   Patient presents with   • Follow-up     Here for testing f/u   • Coronary Artery Disease   • Hyperlipidemia   • Sleep Apnea       PROBLEM LIST:       1. CAD  1.1 [facility], , demonstrated mod. Mid-LAD disease. EF 65%  1.2 Stress test, 8-3-2021, no ischemia  2. Hyperlipidemia  3. RAMONA, uses c-pap, followed by Dr. Daniels.  4. Basal cell Carcinoma left ear  5. GERD  6. Shortness of breath  7. Former smoker  8. Echo, 8-3-2021, EF 50-55%, 3D 57%, grade 1 DD, trivial MR, trivial-mild TR, pulm. Pressures low 30's  9. Palpitations  9.1 [facility], 9-5-19 - 10-4-19, sinus, SB sleeping hors, occas. PVC'S    Specialty Problems        Cardiology Problems    Coronary artery disease due to calcified coronary lesion        Mixed hyperlipidemia                HPI:  Anthony returns for follow-up on testing.  He continues to have episodes of shortness of breath although the symptoms have not worsened and perhaps are somewhat improved from the time of his prior visit.    Stress testing demonstrated no evidence of ischemia with preserved LV systolic function.    Echo confirmed normal LV systolic function, demonstrated grade 1A diastolic dysfunction, with no significant valve, pericardial, or great vessel pathology.  PA pressures are in the low 30s.                        PRIOR MEDICATIONS    Current Outpatient Medications on File Prior to Visit   Medication Sig Dispense Refill   • amLODIPine (NORVASC) 2.5 MG tablet Take 1 tablet by mouth Daily. 90 tablet 3   • aspirin 81 MG tablet Take 81 mg by mouth. Takes approx. Every 3rd. day     • pravastatin (PRAVACHOL) 40 MG tablet TAKE ONE TABLET BY MOUTH DAILY 90 tablet 3     No current facility-administered medications on file prior to visit.       ALLERGIES:    Penicillins, Doxycycline, Keflex [cephalexin], and Meclizine    PAST MEDICAL HISTORY:    Past Medical History:   Diagnosis Date   • Cancer (HCC)     basal cell  "carcinoma left ear   • Chest pain    • GERD (gastroesophageal reflux disease)    • Hyperlipidemia    • Shortness of breath    • Sleep apnea     uses c-pap followed by Dr. waters.   • Stomach ulcer        SURGICAL HISTORY:    Past Surgical History:   Procedure Laterality Date   • ENDOSCOPY AND COLONOSCOPY     • EXTRACORPOREAL SHOCK WAVE LITHOTRIPSY (ESWL)     • INGUINAL HERNIA REPAIR     • TESTICLE SURGERY      removal x 1       SOCIAL HISTORY:    Social History     Socioeconomic History   • Marital status:    Tobacco Use   • Smoking status: Former Smoker     Types: Cigarettes   • Smokeless tobacco: Never Used   • Tobacco comment: used to smoke approx 1 PPD for approx. 30 yrs.    Substance and Sexual Activity   • Alcohol use: No   • Drug use: No       FAMILY HISTORY:    Family History   Problem Relation Age of Onset   • No Known Problems Mother    • Stroke Father    • Heart attack Father    • Hypertension Father    • Hyperlipidemia Father    • Diabetes Father        Review of Systems   Constitutional: Negative.    HENT: Negative.    Eyes: Negative.    Respiratory: Negative.    Cardiovascular: Positive for palpitations (occas. ). Negative for chest pain and leg swelling.   Gastrointestinal: Negative.    Endocrine: Negative.    Genitourinary: Negative.    Musculoskeletal: Negative.    Skin: Negative.    Allergic/Immunologic: Positive for environmental allergies.   Neurological: Positive for light-headedness (occas. ). Negative for syncope.   Hematological: Bruises/bleeds easily.   Psychiatric/Behavioral: Negative.        VISIT VITALS:  Vitals:    10/19/21 1009   BP: 120/69   BP Location: Left arm   Patient Position: Sitting   Pulse: 62   SpO2: 95%   Weight: 94.7 kg (208 lb 12.8 oz)   Height: 178 cm (70.08\")      /69 (BP Location: Left arm, Patient Position: Sitting)   Pulse 62   Ht 178 cm (70.08\")   Wt 94.7 kg (208 lb 12.8 oz)   SpO2 95%   BMI 29.89 kg/m²     RECENT LABS:    Objective       Physical " Exam    Procedures      Assessment/Plan   #1.  Coronary artery disease.  Anthony had none high-grade LAD disease at the time of prior catheterization with low risk stress testing recently.  He has no angina.  We will continue clinical monitoring and risk factor modification.    2.  Exertional dyspnea.  I do not believe that the degree of diastolic dysfunction present as demonstrated by recent echo is a major contributor to exertional dyspnea.  We will perform pulmonary function studies as discussed previously and refer to pulmonary medicine if indicated.    3.  Controlled systemic hypertension.    4.  Treated dyslipidemia.    5.  Other problems as listed above stable.    6.  Mr. Sahu will follow with Dr. Reyes as instructed, with Dr. Henriquez as scheduled, and in our office in 1 year or on a as needed basis for symptoms as discussed in detail today.   Diagnosis Plan   1. Precordial pain     2. Mixed hyperlipidemia     3. Coronary artery disease due to calcified coronary lesion     4. Shortness of breath     5. RAMONA on CPAP     6. Former smoker         No follow-ups on file.         Anthony Sahu  reports that he has quit smoking. His smoking use included cigarettes. He has never used smokeless tobacco.. I have educated him on the risk of diseases from using tobacco products such as cancer, COPD and heart disease.     ACP discussion was held with the patient during this visit. Patient does not have an advance directive, declines further assistance.        Patient's Body mass index is 29.89 kg/m². indicating that he is overweight (BMI 25-29.9). Obesity-related health conditions include the following: obstructive sleep apnea, coronary heart disease, dyslipidemias and GERD. Obesity is to be assessed at today's visit. BMI is pcp addressing. We discussed portion control and increasing exercise..       Yuly Fish LPN    Scribed for Dr. Ghassan Bo by Yuly Fish LPN October 19, 2021 10:12 EDT          Electronically signed by:            This note is dictated utilizing voice recognition software.  Although this record has been proof read, transcriptional errors may still be present. If questions occur regarding the content of this record please do not hesitate to call our office.

## 2021-12-22 ENCOUNTER — TELEPHONE (OUTPATIENT)
Dept: CARDIOLOGY | Facility: CLINIC | Age: 71
End: 2021-12-22

## 2021-12-22 NOTE — TELEPHONE ENCOUNTER
PATIENT AWARE PFT'S ABNL. AND PER DR. BO REFER TO PULM. PATIENT STATES HE ALREADY SEE'S DR. JOY. AND SHE IS AWARE RESULTS. PH,LPN        ----- Message from Ghassan Bo MD sent at 12/22/2021 12:09 PM EST -----  Refer to pulm.   ----- Message -----  From: Interface, Scans Incoming  Sent: 12/22/2021   5:47 AM EST  To: Ghassan Bo MD

## 2022-01-09 DIAGNOSIS — E78.2 MIXED HYPERLIPIDEMIA: ICD-10-CM

## 2022-01-10 DIAGNOSIS — E78.2 MIXED HYPERLIPIDEMIA: ICD-10-CM

## 2022-01-10 RX ORDER — PRAVASTATIN SODIUM 40 MG
40 TABLET ORAL DAILY
Qty: 90 TABLET | Refills: 3 | Status: SHIPPED | OUTPATIENT
Start: 2022-01-10 | End: 2023-01-13

## 2022-01-10 RX ORDER — PRAVASTATIN SODIUM 40 MG
TABLET ORAL
Qty: 90 TABLET | Refills: 3 | Status: SHIPPED | OUTPATIENT
Start: 2022-01-10 | End: 2022-01-10 | Stop reason: SDUPTHER

## 2022-10-25 ENCOUNTER — OFFICE VISIT (OUTPATIENT)
Dept: CARDIOLOGY | Facility: CLINIC | Age: 72
End: 2022-10-25

## 2022-10-25 VITALS
WEIGHT: 207 LBS | SYSTOLIC BLOOD PRESSURE: 145 MMHG | DIASTOLIC BLOOD PRESSURE: 81 MMHG | HEART RATE: 62 BPM | OXYGEN SATURATION: 96 % | BODY MASS INDEX: 29.63 KG/M2 | HEIGHT: 70 IN

## 2022-10-25 DIAGNOSIS — Z87.891 FORMER SMOKER: ICD-10-CM

## 2022-10-25 DIAGNOSIS — R07.2 PRECORDIAL PAIN: ICD-10-CM

## 2022-10-25 DIAGNOSIS — R06.02 SHORTNESS OF BREATH: ICD-10-CM

## 2022-10-25 DIAGNOSIS — E78.2 MIXED HYPERLIPIDEMIA: ICD-10-CM

## 2022-10-25 DIAGNOSIS — G47.33 OSA ON CPAP: ICD-10-CM

## 2022-10-25 DIAGNOSIS — I10 ESSENTIAL HYPERTENSION: ICD-10-CM

## 2022-10-25 DIAGNOSIS — I25.84 CORONARY ARTERY DISEASE DUE TO CALCIFIED CORONARY LESION: Primary | ICD-10-CM

## 2022-10-25 DIAGNOSIS — I25.10 CORONARY ARTERY DISEASE DUE TO CALCIFIED CORONARY LESION: Primary | ICD-10-CM

## 2022-10-25 DIAGNOSIS — Z99.89 OSA ON CPAP: ICD-10-CM

## 2022-10-25 PROCEDURE — 99214 OFFICE O/P EST MOD 30 MIN: CPT | Performed by: INTERNAL MEDICINE

## 2022-10-25 PROCEDURE — 93000 ELECTROCARDIOGRAM COMPLETE: CPT | Performed by: INTERNAL MEDICINE

## 2022-10-25 RX ORDER — DONEPEZIL HYDROCHLORIDE 5 MG/1
TABLET, FILM COATED ORAL DAILY
COMMUNITY
Start: 2022-09-14

## 2022-10-25 RX ORDER — AMLODIPINE BESYLATE 5 MG/1
5 TABLET ORAL DAILY
Qty: 30 TABLET | Refills: 5 | Status: SHIPPED | OUTPATIENT
Start: 2022-10-25

## 2022-10-25 RX ORDER — ISOSORBIDE MONONITRATE 30 MG/1
15 TABLET, EXTENDED RELEASE ORAL DAILY
Qty: 15 TABLET | Refills: 5 | Status: SHIPPED | OUTPATIENT
Start: 2022-10-25 | End: 2023-01-17

## 2022-10-25 RX ORDER — MULTIPLE VITAMINS W/ MINERALS TAB 9MG-400MCG
1 TAB ORAL DAILY
COMMUNITY

## 2023-01-13 DIAGNOSIS — E78.2 MIXED HYPERLIPIDEMIA: ICD-10-CM

## 2023-01-13 RX ORDER — PRAVASTATIN SODIUM 40 MG
TABLET ORAL
Qty: 90 TABLET | Refills: 3 | Status: SHIPPED | OUTPATIENT
Start: 2023-01-13

## 2023-01-17 ENCOUNTER — OFFICE VISIT (OUTPATIENT)
Dept: CARDIOLOGY | Facility: CLINIC | Age: 73
End: 2023-01-17
Payer: MEDICARE

## 2023-01-17 VITALS
DIASTOLIC BLOOD PRESSURE: 70 MMHG | BODY MASS INDEX: 29.29 KG/M2 | SYSTOLIC BLOOD PRESSURE: 123 MMHG | HEART RATE: 58 BPM | WEIGHT: 204.6 LBS | HEIGHT: 70 IN | OXYGEN SATURATION: 96 %

## 2023-01-17 DIAGNOSIS — R06.02 SHORTNESS OF BREATH: ICD-10-CM

## 2023-01-17 DIAGNOSIS — G47.33 OSA ON CPAP: ICD-10-CM

## 2023-01-17 DIAGNOSIS — R07.2 PRECORDIAL PAIN: ICD-10-CM

## 2023-01-17 DIAGNOSIS — Z87.891 FORMER SMOKER: ICD-10-CM

## 2023-01-17 DIAGNOSIS — I25.84 CORONARY ARTERY DISEASE DUE TO CALCIFIED CORONARY LESION: Primary | ICD-10-CM

## 2023-01-17 DIAGNOSIS — I25.10 CORONARY ARTERY DISEASE DUE TO CALCIFIED CORONARY LESION: Primary | ICD-10-CM

## 2023-01-17 DIAGNOSIS — Z99.89 OSA ON CPAP: ICD-10-CM

## 2023-01-17 DIAGNOSIS — E78.2 MIXED HYPERLIPIDEMIA: ICD-10-CM

## 2023-01-17 PROCEDURE — 99213 OFFICE O/P EST LOW 20 MIN: CPT | Performed by: INTERNAL MEDICINE

## 2023-01-17 RX ORDER — CHLORAL HYDRATE 500 MG
1000 CAPSULE ORAL
COMMUNITY

## 2023-01-17 RX ORDER — MULTIVIT WITH MINERALS/LUTEIN
1000 TABLET ORAL DAILY
COMMUNITY

## 2023-01-17 RX ORDER — CHOLECALCIFEROL (VITAMIN D3) 125 MCG
CAPSULE ORAL DAILY
COMMUNITY

## 2023-01-17 RX ORDER — LOPERAMIDE HYDROCHLORIDE 2 MG/1
2 CAPSULE ORAL DAILY
COMMUNITY

## 2023-01-17 NOTE — PROGRESS NOTES
Subjective   Anthony Sahu is a 72 y.o. male     Chief Complaint   Patient presents with   • Follow-up     Here for 3 mo. F/u   • Coronary Artery Disease   • Hyperlipidemia   • Sleep Apnea       PROBLEM LIST:     1. CAD  1.1 [facility], , demonstrated mod. Mid-LAD disease. EF 65%  1.2 Stress test, 8-3-2021, no ischemia  2. Hyperlipidemia  3. RAMONA, uses c-pap, followed by Dr. Daniels.  4. Basal cell Carcinoma left ear  5. GERD  6. Shortness of breath  7. Former smoker  8. Echo, 8-3-2021, EF 50-55%, 3D 57%, grade 1 DD, trivial MR, trivial-mild TR, pulm. Pressures low 30's  9. Palpitations  9.1 Event Monitor, 9-5-19 - 10-4-19, sinus, SB sleeping hors, occas. PVC'S    Specialty Problems        Cardiology Problems    Coronary artery disease due to calcified coronary lesion        Mixed hyperlipidemia             HPI:  Anthony returns for follow-up to assess response to therapy.    He trialed isosorbide for 2 to 3 weeks but had to stop it because of severe headache.  He has been able to tolerate increased dose of amlodipine.  Neither of these maneuvers had a significant effect on his exertional chest discomfort or exertional dyspnea which both are worse than the symptoms he experienced at the time of stress testing in 2021 which demonstrated no evidence of ischemia    Mr. Sahu is symptomatically unchanged otherwise.                            PRIOR MEDICATIONS    Current Outpatient Medications on File Prior to Visit   Medication Sig Dispense Refill   • amLODIPine (NORVASC) 5 MG tablet Take 1 tablet by mouth Daily. 30 tablet 5   • ascorbic acid (VITAMIN C) 1000 MG tablet Take 1,000 mg by mouth Daily.     • aspirin 81 MG tablet Take 81 mg by mouth. Takes approx. Every 3rd. day     • Cholecalciferol (Vitamin D3) 50 MCG (2000 UT) tablet Take  by mouth Daily.     • donepezil (ARICEPT) 5 MG tablet Daily.     • loperamide (IMODIUM) 2 MG capsule Take 2 mg by mouth Daily.     • multivitamin with minerals tablet tablet Take 1  tablet by mouth Daily.     • Omega-3 Fatty Acids (fish oil) 1000 MG capsule capsule Take 1,000 mg by mouth Daily With Breakfast.     • pantoprazole (PROTONIX) 40 MG EC tablet Daily.     • pravastatin (PRAVACHOL) 40 MG tablet TAKE ONE TABLET BY MOUTH DAILY 90 tablet 3   • [DISCONTINUED] isosorbide mononitrate (IMDUR) 30 MG 24 hr tablet Take 0.5 tablets by mouth Daily. 15 tablet 5   • [DISCONTINUED] ondansetron (ZOFRAN) 4 MG tablet prn       No current facility-administered medications on file prior to visit.       ALLERGIES:    Penicillins, Doxycycline, Keflex [cephalexin], and Meclizine    PAST MEDICAL HISTORY:    Past Medical History:   Diagnosis Date   • Cancer (HCC)     basal cell carcinoma left ear   • Chest pain    • GERD (gastroesophageal reflux disease)    • Hyperlipidemia    • Shortness of breath    • Sleep apnea     uses c-pap followed by Dr. waters.   • Stomach ulcer        SURGICAL HISTORY:    Past Surgical History:   Procedure Laterality Date   • ENDOSCOPY AND COLONOSCOPY     • EXTRACORPOREAL SHOCK WAVE LITHOTRIPSY (ESWL)     • INGUINAL HERNIA REPAIR     • TESTICLE SURGERY      removal x 1       SOCIAL HISTORY:    Social History     Socioeconomic History   • Marital status:    Tobacco Use   • Smoking status: Former     Types: Cigarettes   • Smokeless tobacco: Never   • Tobacco comments:     used to smoke approx 1 PPD for approx. 30 yrs.    Substance and Sexual Activity   • Alcohol use: No   • Drug use: No       FAMILY HISTORY:    Family History   Problem Relation Age of Onset   • No Known Problems Mother    • Stroke Father    • Heart attack Father    • Hypertension Father    • Hyperlipidemia Father    • Diabetes Father        Review of Systems   Constitutional: Negative.    HENT: Negative.    Eyes: Positive for visual disturbance (glasses prn).   Respiratory: Positive for shortness of breath (mildly).    Cardiovascular: Negative.    Gastrointestinal: Positive for diarrhea.   Endocrine: Negative.   "  Genitourinary: Negative.    Musculoskeletal: Positive for arthralgias and myalgias.   Skin: Negative.    Allergic/Immunologic: Negative.    Neurological: Positive for dizziness. Negative for syncope.   Hematological: Bruises/bleeds easily.   Psychiatric/Behavioral: Negative.        VISIT VITALS:  Vitals:    01/17/23 1136   BP: 123/70   BP Location: Left arm   Patient Position: Sitting   Pulse: 58   SpO2: 96%   Weight: 92.8 kg (204 lb 9.6 oz)   Height: 177.8 cm (70\")      /70 (BP Location: Left arm, Patient Position: Sitting)   Pulse 58   Ht 177.8 cm (70\")   Wt 92.8 kg (204 lb 9.6 oz)   SpO2 96%   BMI 29.36 kg/m²     RECENT LABS:    Objective       Physical Exam    Procedures      Assessment & Plan   #1.  Persistent chest discomfort and exertional dyspnea.  We were hoping to control symptoms with empiric therapy given prior low risk stress testing.  However, as Anthony did not tolerate therapy we will need to repeat stress testing for restratification and to direct therapy.    2.  We will pursue positive stress test results as indicated.  If stress test again demonstrates no evidence of ischemia I recommend that the patient begin a regular aerobic exercise and muscle strengthening regimen.    3.  Anthony will follow with Dr. Reyes as instructed we will plan on seeing him in follow-up in 6 months or on appearing basis as discussed.   Diagnosis Plan   1. Coronary artery disease due to calcified coronary lesion        2. Mixed hyperlipidemia        3. Precordial pain        4. Shortness of breath        5. RAMONA on CPAP        6. Former smoker            No follow-ups on file.         Anthony Sahu  reports that he has quit smoking. His smoking use included cigarettes. He has never used smokeless tobacco.. I have educated him on the risk of diseases from using tobacco products such as cancer, COPD and heart disease.          Advance Care Planning   ACP discussion was held with the patient during this visit. " Patient has an advance directive in EMR which is still valid.         BMI is >= 25 and <30. (Overweight) The following options were offered after discussion;: pcp addressing             Electronically signed by:    Scribed for Ghassan Bo MD by Yuly Fish LPN on January 17, 2023  at 11:40 EST    I, Ghassan Bo MD personally performed the services described in this documentation as scribed by the above named individual in my presence, and it is both accurate and complete. January 17, 2023 11:40 EST      Dictated Utilizing Dragon Dictation: Part of this note may be an electronic transcription/translation of spoken language to printed text using the Dragon Dictation System.

## 2023-02-15 ENCOUNTER — HOSPITAL ENCOUNTER (OUTPATIENT)
Dept: CARDIOLOGY | Facility: HOSPITAL | Age: 73
Discharge: HOME | End: 2023-02-15
Payer: MEDICARE

## 2023-02-15 DIAGNOSIS — R06.02 SHORTNESS OF BREATH: ICD-10-CM

## 2023-02-15 DIAGNOSIS — R07.2 PRECORDIAL PAIN: ICD-10-CM

## 2023-02-15 DIAGNOSIS — E78.2 MIXED HYPERLIPIDEMIA: ICD-10-CM

## 2023-02-15 DIAGNOSIS — I25.10 CORONARY ARTERY DISEASE DUE TO CALCIFIED CORONARY LESION: ICD-10-CM

## 2023-02-15 DIAGNOSIS — Z87.891 FORMER SMOKER: ICD-10-CM

## 2023-02-15 DIAGNOSIS — I25.84 CORONARY ARTERY DISEASE DUE TO CALCIFIED CORONARY LESION: ICD-10-CM

## 2023-02-15 LAB
BH CV REST NUCLEAR ISOTOPE DOSE: 10 MCI
BH CV STRESS COMMENTS STAGE 1: NORMAL
BH CV STRESS DOSE REGADENOSON STAGE 1: 0.4
BH CV STRESS DURATION MIN STAGE 1: 0
BH CV STRESS DURATION SEC STAGE 1: 10
BH CV STRESS NUCLEAR ISOTOPE DOSE: 30 MCI
BH CV STRESS PROTOCOL 1: NORMAL
BH CV STRESS RECOVERY BP: NORMAL MMHG
BH CV STRESS RECOVERY HR: 78 BPM
BH CV STRESS STAGE 1: 1
MAXIMAL PREDICTED HEART RATE: 146 BPM
PERCENT MAX PREDICTED HR: 58.9 %
STRESS BASELINE BP: NORMAL MMHG
STRESS BASELINE HR: 61 BPM
STRESS PERCENT HR: 69 %
STRESS POST PEAK BP: NORMAL MMHG
STRESS POST PEAK HR: 86 BPM
STRESS TARGET HR: 124 BPM

## 2023-02-15 PROCEDURE — 78452 HT MUSCLE IMAGE SPECT MULT: CPT | Performed by: INTERNAL MEDICINE

## 2023-02-15 PROCEDURE — 93018 CV STRESS TEST I&R ONLY: CPT | Performed by: INTERNAL MEDICINE

## 2023-02-15 PROCEDURE — 0 TECHNETIUM SESTAMIBI: Performed by: INTERNAL MEDICINE

## 2023-02-15 PROCEDURE — 93017 CV STRESS TEST TRACING ONLY: CPT

## 2023-02-15 PROCEDURE — 78452 HT MUSCLE IMAGE SPECT MULT: CPT

## 2023-02-15 PROCEDURE — A9500 TC99M SESTAMIBI: HCPCS | Performed by: INTERNAL MEDICINE

## 2023-02-15 PROCEDURE — 25010000002 REGADENOSON 0.4 MG/5ML SOLUTION: Performed by: INTERNAL MEDICINE

## 2023-02-15 RX ADMIN — REGADENOSON 0.4 MG: 0.08 INJECTION, SOLUTION INTRAVENOUS at 10:55

## 2023-02-15 RX ADMIN — TECHNETIUM TC 99M SESTAMIBI 1 DOSE: 1 INJECTION INTRAVENOUS at 10:55

## 2023-02-15 RX ADMIN — TECHNETIUM TC 99M SESTAMIBI 1 DOSE: 1 INJECTION INTRAVENOUS at 08:27

## 2023-02-16 ENCOUNTER — TELEPHONE (OUTPATIENT)
Dept: CARDIOLOGY | Facility: CLINIC | Age: 73
End: 2023-02-16
Payer: MEDICARE

## 2023-02-16 NOTE — TELEPHONE ENCOUNTER
STRESS TEST RESULTS BRIEFLY DISCUSSED. LE,LPN          ----- Message from Ghassan Bo MD sent at 2/16/2023  9:09 AM EST -----  Keep f/u appt.   ----- Message -----  From: Ghassan Bo MD  Sent: 2/15/2023  10:13 PM EST  To: Ghassan Bo MD

## 2023-04-26 ENCOUNTER — TELEPHONE (OUTPATIENT)
Dept: CARDIOLOGY | Facility: CLINIC | Age: 73
End: 2023-04-26

## 2023-04-26 NOTE — TELEPHONE ENCOUNTER
Caller: Anthony Sahu    Relationship to patient: Self    Best call back number: [phone]    Patient is needing: PT CALLED REPORTING HE RECEIVED A CALL YESTERDAY - HUB WAS UNABLE TO WT TO TRY TO IDENTIFY WHO CALLED

## 2023-05-04 DIAGNOSIS — R07.2 PRECORDIAL PAIN: ICD-10-CM

## 2023-05-04 DIAGNOSIS — I10 ESSENTIAL HYPERTENSION: ICD-10-CM

## 2023-05-05 RX ORDER — AMLODIPINE BESYLATE 5 MG/1
TABLET ORAL
Qty: 30 TABLET | Refills: 5 | Status: SHIPPED | OUTPATIENT
Start: 2023-05-05

## 2023-08-24 ENCOUNTER — OFFICE VISIT (OUTPATIENT)
Dept: CARDIOLOGY | Facility: CLINIC | Age: 73
End: 2023-08-24
Payer: MEDICARE

## 2023-08-24 ENCOUNTER — LAB (OUTPATIENT)
Dept: CARDIOLOGY | Facility: CLINIC | Age: 73
End: 2023-08-24
Payer: MEDICARE

## 2023-08-24 VITALS
DIASTOLIC BLOOD PRESSURE: 73 MMHG | HEART RATE: 63 BPM | BODY MASS INDEX: 28.69 KG/M2 | SYSTOLIC BLOOD PRESSURE: 135 MMHG | HEIGHT: 70 IN | WEIGHT: 200.4 LBS | OXYGEN SATURATION: 98 %

## 2023-08-24 DIAGNOSIS — R06.02 SHORTNESS OF BREATH: ICD-10-CM

## 2023-08-24 DIAGNOSIS — R07.2 PRECORDIAL PAIN: ICD-10-CM

## 2023-08-24 DIAGNOSIS — G47.33 OSA ON CPAP: ICD-10-CM

## 2023-08-24 DIAGNOSIS — I25.84 CORONARY ARTERY DISEASE DUE TO CALCIFIED CORONARY LESION: Primary | ICD-10-CM

## 2023-08-24 DIAGNOSIS — Z87.891 FORMER SMOKER: ICD-10-CM

## 2023-08-24 DIAGNOSIS — Z99.89 OSA ON CPAP: ICD-10-CM

## 2023-08-24 DIAGNOSIS — I25.84 CORONARY ARTERY DISEASE DUE TO CALCIFIED CORONARY LESION: ICD-10-CM

## 2023-08-24 DIAGNOSIS — I25.10 CORONARY ARTERY DISEASE DUE TO CALCIFIED CORONARY LESION: Primary | ICD-10-CM

## 2023-08-24 DIAGNOSIS — E78.2 MIXED HYPERLIPIDEMIA: ICD-10-CM

## 2023-08-24 DIAGNOSIS — I25.10 CORONARY ARTERY DISEASE DUE TO CALCIFIED CORONARY LESION: ICD-10-CM

## 2023-08-24 LAB
ANION GAP SERPL CALCULATED.3IONS-SCNC: 13.2 MMOL/L (ref 5–15)
BASOPHILS # BLD AUTO: 0.05 10*3/MM3 (ref 0–0.2)
BASOPHILS NFR BLD AUTO: 0.5 % (ref 0–1.5)
BUN SERPL-MCNC: 20 MG/DL (ref 8–23)
BUN/CREAT SERPL: 18.3 (ref 7–25)
CALCIUM SPEC-SCNC: 9.9 MG/DL (ref 8.6–10.5)
CHLORIDE SERPL-SCNC: 101 MMOL/L (ref 98–107)
CO2 SERPL-SCNC: 22.8 MMOL/L (ref 22–29)
CREAT SERPL-MCNC: 1.09 MG/DL (ref 0.76–1.27)
DEPRECATED RDW RBC AUTO: 48.3 FL (ref 37–54)
EGFRCR SERPLBLD CKD-EPI 2021: 71.2 ML/MIN/1.73
EOSINOPHIL # BLD AUTO: 0.26 10*3/MM3 (ref 0–0.4)
EOSINOPHIL NFR BLD AUTO: 2.5 % (ref 0.3–6.2)
ERYTHROCYTE [DISTWIDTH] IN BLOOD BY AUTOMATED COUNT: 13.8 % (ref 12.3–15.4)
GLUCOSE SERPL-MCNC: 98 MG/DL (ref 65–99)
HCT VFR BLD AUTO: 45 % (ref 37.5–51)
HGB BLD-MCNC: 14 G/DL (ref 13–17.7)
IMM GRANULOCYTES # BLD AUTO: 0.04 10*3/MM3 (ref 0–0.05)
IMM GRANULOCYTES NFR BLD AUTO: 0.4 % (ref 0–0.5)
LYMPHOCYTES # BLD AUTO: 2.4 10*3/MM3 (ref 0.7–3.1)
LYMPHOCYTES NFR BLD AUTO: 23.4 % (ref 19.6–45.3)
MCH RBC QN AUTO: 29.9 PG (ref 26.6–33)
MCHC RBC AUTO-ENTMCNC: 31.1 G/DL (ref 31.5–35.7)
MCV RBC AUTO: 95.9 FL (ref 79–97)
MONOCYTES # BLD AUTO: 0.99 10*3/MM3 (ref 0.1–0.9)
MONOCYTES NFR BLD AUTO: 9.6 % (ref 5–12)
NEUTROPHILS NFR BLD AUTO: 6.52 10*3/MM3 (ref 1.7–7)
NEUTROPHILS NFR BLD AUTO: 63.6 % (ref 42.7–76)
NRBC BLD AUTO-RTO: 0 /100 WBC (ref 0–0.2)
PLATELET # BLD AUTO: 272 10*3/MM3 (ref 140–450)
PMV BLD AUTO: 9.8 FL (ref 6–12)
POTASSIUM SERPL-SCNC: 4.3 MMOL/L (ref 3.5–5.2)
RBC # BLD AUTO: 4.69 10*6/MM3 (ref 4.14–5.8)
SODIUM SERPL-SCNC: 137 MMOL/L (ref 136–145)
WBC NRBC COR # BLD: 10.26 10*3/MM3 (ref 3.4–10.8)

## 2023-08-24 PROCEDURE — 80048 BASIC METABOLIC PNL TOTAL CA: CPT | Performed by: INTERNAL MEDICINE

## 2023-08-24 PROCEDURE — 85025 COMPLETE CBC W/AUTO DIFF WBC: CPT | Performed by: INTERNAL MEDICINE

## 2023-08-24 PROCEDURE — 99214 OFFICE O/P EST MOD 30 MIN: CPT | Performed by: INTERNAL MEDICINE

## 2023-08-24 RX ORDER — TAMSULOSIN HYDROCHLORIDE 0.4 MG/1
1 CAPSULE ORAL DAILY
COMMUNITY
Start: 2023-06-16

## 2023-08-24 RX ORDER — NITROGLYCERIN 0.4 MG/1
TABLET SUBLINGUAL
Qty: 25 TABLET | Refills: 2 | Status: SHIPPED | OUTPATIENT
Start: 2023-08-24

## 2023-08-24 NOTE — PROGRESS NOTES
Subjective   Anthony Sahu is a 72 y.o. male     Chief Complaint   Patient presents with    Follow-up     Here for 6 mo. F/u    Coronary Artery Disease    Hyperlipidemia    Sleep Apnea       PROBLEM LIST:        1. CAD  1.1 [facility], , demonstrated mod. Mid-LAD disease. EF 65%  1.2 Stress test, 8-3-2021, no ischemia  2. Hyperlipidemia  3. RAMONA, uses c-pap, followed by Dr. Daniels.  4. Basal cell Carcinoma left ear  5. GERD  6. Shortness of breath  7. Former smoker  8. Echo, 8-3-2021, EF 50-55%, 3D 57%, grade 1 DD, trivial MR, trivial-mild TR, pulm. Pressures low 30's  9. Palpitations  9.1 Event Monitor, 9-5-19 - 10-4-19, sinus, SB sleeping hors, occas. PVC'S    Specialty Problems          Cardiology Problems    Coronary artery disease due to calcified coronary lesion        Mixed hyperlipidemia             HPI:  Anthony returns for follow-up on the above.    He continues to have exertional dyspnea unchanged from the time of his prior visit in October of last year in January of this year.  Additionally he continues to have chest discomfort.  He describes a retrosternal heaviness accompanied by shortness of breath.  Symptoms are generally nonexertional, lasting minutes, and are self-limited.  Brock had no change in his functional capacity.  He has had no orthopnea, PND, or change in minimal lower extremity edema.  Palpitations are unchanged from prior.  He has had no symptoms of TIA or stroke and he does not care describe other symptoms of peripheral arterial disease.  Blood pressures have been well controlled.                      PRIOR MEDICATIONS    Current Outpatient Medications on File Prior to Visit   Medication Sig Dispense Refill    amLODIPine (NORVASC) 5 MG tablet TAKE ONE TABLET BY MOUTH DAILY 30 tablet 5    ascorbic acid (VITAMIN C) 1000 MG tablet Take 1 tablet by mouth Daily.      aspirin 81 MG tablet Take 1 tablet by mouth. Takes twice a week due to bruising      Cholecalciferol (Vitamin D3) 50 MCG (2000  UT) tablet Take  by mouth Daily.      donepezil (ARICEPT) 5 MG tablet Daily.      loperamide (IMODIUM) 2 MG capsule Take 1 capsule by mouth Daily.      multivitamin with minerals tablet tablet Take 1 tablet by mouth Daily.      Omega-3 Fatty Acids (fish oil) 1000 MG capsule capsule Take 1 capsule by mouth Daily With Breakfast.      pantoprazole (PROTONIX) 40 MG EC tablet Daily.      pravastatin (PRAVACHOL) 40 MG tablet TAKE ONE TABLET BY MOUTH DAILY 90 tablet 3    tamsulosin (FLOMAX) 0.4 MG capsule 24 hr capsule Take 1 capsule by mouth Daily.       No current facility-administered medications on file prior to visit.       ALLERGIES:    Penicillins, Doxycycline, Imdur [isosorbide nitrate], Keflex [cephalexin], and Meclizine    PAST MEDICAL HISTORY:    Past Medical History:   Diagnosis Date    Cancer     basal cell carcinoma left ear    Chest pain     GERD (gastroesophageal reflux disease)     Hyperlipidemia     Shortness of breath     Sleep apnea     uses c-pap followed by Dr. waters.    Stomach ulcer        SURGICAL HISTORY:    Past Surgical History:   Procedure Laterality Date    ENDOSCOPY AND COLONOSCOPY      EXTRACORPOREAL SHOCK WAVE LITHOTRIPSY (ESWL)      INGUINAL HERNIA REPAIR      TESTICLE SURGERY      removal x 1       SOCIAL HISTORY:    Social History     Socioeconomic History    Marital status:    Tobacco Use    Smoking status: Former     Types: Cigarettes    Smokeless tobacco: Never    Tobacco comments:     used to smoke approx 1 PPD for approx. 30 yrs.    Substance and Sexual Activity    Alcohol use: No    Drug use: No       FAMILY HISTORY:    Family History   Problem Relation Age of Onset    No Known Problems Mother     Stroke Father     Heart attack Father     Hypertension Father     Hyperlipidemia Father     Diabetes Father        Review of Systems   Constitutional:  Positive for fatigue (mild).   HENT: Negative.     Eyes:  Positive for visual disturbance (glasses).   Respiratory:  Positive  "for shortness of breath.    Cardiovascular:  Positive for chest pain and palpitations. Negative for leg swelling.   Gastrointestinal: Negative.    Endocrine: Negative.    Genitourinary: Negative.    Musculoskeletal: Negative.    Skin: Negative.    Allergic/Immunologic: Positive for environmental allergies.   Neurological: Negative.    Hematological:  Bruises/bleeds easily.   Psychiatric/Behavioral: Negative.       VISIT VITALS:  Vitals:    08/24/23 0942   BP: 135/73   BP Location: Left arm   Patient Position: Sitting   Cuff Size: Adult   Pulse: 63   SpO2: 98%   Weight: 90.9 kg (200 lb 6.4 oz)   Height: 177.8 cm (70\")      /73 (BP Location: Left arm, Patient Position: Sitting, Cuff Size: Adult)   Pulse 63   Ht 177.8 cm (70\")   Wt 90.9 kg (200 lb 6.4 oz)   SpO2 98%   BMI 28.75 kg/mý     RECENT LABS:    Objective       Physical Exam  Vitals and nursing note reviewed.   Constitutional:       General: He is not in acute distress.     Appearance: He is well-developed.   HENT:      Head: Normocephalic and atraumatic.   Eyes:      Conjunctiva/sclera: Conjunctivae normal.      Pupils: Pupils are equal, round, and reactive to light.   Neck:      Vascular: No carotid bruit, hepatojugular reflux or JVD.      Trachea: No tracheal deviation.      Comments: Nl. Carotid upstrokes    Cardiovascular:      Rate and Rhythm: Normal rate and regular rhythm.      Pulses:           Radial pulses are 2+ on the right side and 2+ on the left side.      Heart sounds: S1 normal and S2 normal. Murmur heard.     No friction rub. Gallop present. S4 sounds present.      Comments: 1/6 TR  No MR  No AI  Pulmonary:      Effort: Pulmonary effort is normal.      Breath sounds: Normal breath sounds. No wheezing, rhonchi or rales.      Comments: Nl. Expir. Phase  Nl. Breath sound intensity  Abdominal:      General: Bowel sounds are normal. There is no distension or abdominal bruit.      Palpations: Abdomen is soft. There is no mass.      " Tenderness: There is no abdominal tenderness. There is no guarding or rebound.      Comments: No organomegaly   Musculoskeletal:         General: No tenderness or deformity. Normal range of motion.      Cervical back: Normal range of motion and neck supple.      Right lower leg: Edema present.      Left lower leg: Edema present.      Comments: LLE, 1+ edema at sock line, palpable PT pedal pulse  RLE, trace edema at sock line, palpable PT pedal pulse   Skin:     General: Skin is warm and dry.      Coloration: Skin is not pale.      Findings: No erythema or rash.   Neurological:      Mental Status: He is alert and oriented to person, place, and time.   Psychiatric:         Behavior: Behavior normal.         Thought Content: Thought content normal.         Judgment: Judgment normal.       Procedures      Assessment & Plan   #1.  Chest pain with some features compatible with ischemia.  Carlos had moderate LAD disease 10 years ago I think that is of assessment needs to be pursued in the setting of persistent symptoms despite low risk stress test in February of this year.  We will schedule for cardiac catheterization.    2.  Controlled systemic hypertension.    3.  Treated dyslipidemia.    4.  Exertional dyspnea.  The patient is class I to II ROACH which has been stable.  This is likely multifactorial in etiology.  If cath does not demonstrate a significant ischemic component we will pursue other potential etiologies.    5.  Mr. Sahu will follow with Dr. Reyes as instructed with further recommendations based on findings at the time of cardiac catheterization.   Diagnosis Plan   1. Coronary artery disease due to calcified coronary lesion        2. Mixed hyperlipidemia        3. Precordial pain        4. Shortness of breath        5. RAMONA on CPAP        6. Former smoker            No follow-ups on file.         Anthony Sahu  reports that he has quit smoking. His smoking use included cigarettes. He has never used  smokeless tobacco.. I have educated him on the risk of diseases from using tobacco products such as cancer, COPD, and heart disease.                Advance Care Planning   ACP discussion was held with the patient during this visit. Patient has an advance directive in EMR which is still valid.                    Electronically signed by:    Scribed for Ghassan Bo MD by Yuly Fish LPN on August 24, 2023  at 10:15 EDT    I, Ghassan Bo MD personally performed the services described in this documentation as scribed by the above named individual in my presence, and it is both accurate and complete. August 24, 2023 10:15 EDT      Dictated Utilizing Dragon Dictation: Part of this note may be an electronic transcription/translation of spoken language to printed text using the Dragon Dictation System.

## 2023-09-15 ENCOUNTER — OUTSIDE FACILITY SERVICE (OUTPATIENT)
Dept: CARDIOLOGY | Facility: CLINIC | Age: 73
End: 2023-09-15
Payer: MEDICARE

## 2023-09-15 PROCEDURE — 93458 L HRT ARTERY/VENTRICLE ANGIO: CPT | Performed by: INTERNAL MEDICINE

## 2023-09-15 PROCEDURE — 92928 PRQ TCAT PLMT NTRAC ST 1 LES: CPT | Performed by: INTERNAL MEDICINE

## 2023-09-15 PROCEDURE — 92978 ENDOLUMINL IVUS OCT C 1ST: CPT | Performed by: INTERNAL MEDICINE

## 2023-09-15 RX ORDER — CLOPIDOGREL BISULFATE 75 MG/1
75 TABLET ORAL DAILY
Qty: 90 TABLET | Refills: 3 | Status: SHIPPED | OUTPATIENT
Start: 2023-09-15

## 2023-09-18 DIAGNOSIS — Z87.891 FORMER SMOKER: ICD-10-CM

## 2023-09-18 DIAGNOSIS — R06.02 SHORTNESS OF BREATH: ICD-10-CM

## 2023-09-18 DIAGNOSIS — E78.2 MIXED HYPERLIPIDEMIA: ICD-10-CM

## 2023-09-18 DIAGNOSIS — R07.2 PRECORDIAL PAIN: ICD-10-CM

## 2023-09-18 DIAGNOSIS — I25.10 CORONARY ARTERY DISEASE DUE TO CALCIFIED CORONARY LESION: ICD-10-CM

## 2023-09-18 DIAGNOSIS — I25.84 CORONARY ARTERY DISEASE DUE TO CALCIFIED CORONARY LESION: ICD-10-CM

## 2023-09-28 ENCOUNTER — OFFICE VISIT (OUTPATIENT)
Dept: CARDIOLOGY | Facility: CLINIC | Age: 73
End: 2023-09-28
Payer: MEDICARE

## 2023-09-28 VITALS
HEIGHT: 70 IN | DIASTOLIC BLOOD PRESSURE: 76 MMHG | HEART RATE: 99 BPM | BODY MASS INDEX: 27.89 KG/M2 | SYSTOLIC BLOOD PRESSURE: 129 MMHG | WEIGHT: 194.8 LBS | OXYGEN SATURATION: 96 %

## 2023-09-28 DIAGNOSIS — R06.02 SHORTNESS OF BREATH: ICD-10-CM

## 2023-09-28 DIAGNOSIS — I25.10 CORONARY ARTERY DISEASE DUE TO CALCIFIED CORONARY LESION: Primary | ICD-10-CM

## 2023-09-28 DIAGNOSIS — I25.84 CORONARY ARTERY DISEASE DUE TO CALCIFIED CORONARY LESION: Primary | ICD-10-CM

## 2023-09-28 DIAGNOSIS — G47.33 OSA ON CPAP: ICD-10-CM

## 2023-09-28 DIAGNOSIS — Z87.891 FORMER SMOKER: ICD-10-CM

## 2023-09-28 DIAGNOSIS — E78.2 MIXED HYPERLIPIDEMIA: ICD-10-CM

## 2023-09-28 DIAGNOSIS — R07.2 PRECORDIAL PAIN: ICD-10-CM

## 2023-09-28 DIAGNOSIS — R09.89 BRUIT OF LEFT CAROTID ARTERY: ICD-10-CM

## 2023-09-28 PROCEDURE — 99213 OFFICE O/P EST LOW 20 MIN: CPT | Performed by: INTERNAL MEDICINE

## 2023-09-28 NOTE — PROGRESS NOTES
Subjective   Anthony Sahu is a 72 y.o. male     Chief Complaint   Patient presents with    Follow-up     Here for [facility] f/u    Coronary Artery Disease    Hyperlipidemia    Sleep Apnea       PROBLEM LIST:        1. CAD  1.1 [facility], , demonstrated mod. Mid-LAD disease. EF 65%  1.2 Stress test, 8-3-2021, no ischemia  1.3 [facility], 9-, IVUS & stenting of LAD  2. Hyperlipidemia  3. RAMONA, uses c-pap, followed by Dr. Daniels.  4. Basal cell Carcinoma left ear  5. GERD  6. Shortness of breath  7. Former smoker  8. Echo, 8-3-2021, EF 50-55%, 3D 57%, grade 1 DD, trivial MR, trivial-mild TR, pulm. Pressures low 30's  9. Palpitations  9.1 Event Monitor, 9-5-19 - 10-4-19, sinus, SB sleeping hors, occas. PVC'S    Specialty Problems          Cardiology Problems    Coronary artery disease due to calcified coronary lesion        Mixed hyperlipidemia             HPI:  Anthony returns for follow-up after stenting of the proximal to mid LAD.    The patient had chest discomfort as well is progressive class III exertional dyspnea felt to be an anginal equivalent.  IVUS demonstrated subtotal stenoses both proximal and distal to the first significant diagonal.  A 3 x 15 mm Silex for anterior stent was then deployed at 12 keo.  With continued patency of the diagonal is guidewire was removed and post deployment high-pressure inflations were performed with a 3.25 mm noncompliant balloon.  IVUS demonstrated optimal stent expansion and apposition.    Since that time Mr. Rousseau has had occasional episodes of nonexertional angina which she is following with GI.  He has had significant improvement in his exertional dyspnea and no longer has exertional chest discomfort.  He is otherwise symptomatically unchanged.                    PRIOR MEDICATIONS    Current Outpatient Medications on File Prior to Visit   Medication Sig Dispense Refill    amLODIPine (NORVASC) 5 MG tablet TAKE ONE TABLET BY MOUTH DAILY 30 tablet 5    ascorbic acid (VITAMIN C)  1000 MG tablet Take 1 tablet by mouth Daily.      aspirin 81 MG tablet Take 1 tablet by mouth Every Other Day.      Cholecalciferol (Vitamin D3) 50 MCG (2000 UT) tablet Take  by mouth Daily.      clopidogrel (PLAVIX) 75 MG tablet Take 1 tablet by mouth Daily. 90 tablet 3    donepezil (ARICEPT) 5 MG tablet Daily.      loperamide (IMODIUM) 2 MG capsule Take 1 capsule by mouth Daily.      multivitamin with minerals tablet tablet Take 1 tablet by mouth Daily.      Omega-3 Fatty Acids (fish oil) 1000 MG capsule capsule Take 1 capsule by mouth Daily With Breakfast.      pantoprazole (PROTONIX) 40 MG EC tablet Daily.      pravastatin (PRAVACHOL) 40 MG tablet TAKE ONE TABLET BY MOUTH DAILY 90 tablet 3    tamsulosin (FLOMAX) 0.4 MG capsule 24 hr capsule Take 1 capsule by mouth Daily.      nitroglycerin (NITROSTAT) 0.4 MG SL tablet 1 under the tongue as needed for angina, may repeat q5mins for up three doses with in 15 minutes (Patient not taking: Reported on 9/28/2023) 25 tablet 2     No current facility-administered medications on file prior to visit.       ALLERGIES:    Penicillins, Doxycycline, Imdur [isosorbide nitrate], Keflex [cephalexin], and Meclizine    PAST MEDICAL HISTORY:    Past Medical History:   Diagnosis Date    Cancer     basal cell carcinoma left ear    Chest pain     Coronary artery disease     GERD (gastroesophageal reflux disease)     History of cardiac cath 09/24/2010    Hyperlipidemia     Shortness of breath     Sleep apnea     uses c-pap followed by Dr. waters.    Stomach ulcer        SURGICAL HISTORY:    Past Surgical History:   Procedure Laterality Date    ENDOSCOPY AND COLONOSCOPY      EXTRACORPOREAL SHOCK WAVE LITHOTRIPSY (ESWL)      INGUINAL HERNIA REPAIR      TESTICLE SURGERY      removal x 1       SOCIAL HISTORY:    Social History     Socioeconomic History    Marital status:    Tobacco Use    Smoking status: Former     Types: Cigarettes    Smokeless tobacco: Never    Tobacco comments:  "    used to smoke approx 1 PPD for approx. 30 yrs.    Substance and Sexual Activity    Alcohol use: No    Drug use: No       FAMILY HISTORY:    Family History   Problem Relation Age of Onset    No Known Problems Mother     Stroke Father     Heart attack Father     Hypertension Father     Hyperlipidemia Father     Diabetes Father        Review of Systems   Constitutional: Negative.    HENT: Negative.     Eyes: Negative.    Respiratory: Negative.     Cardiovascular:  Positive for chest pain and palpitations. Negative for leg swelling.   Gastrointestinal: Negative.    Endocrine: Negative.    Genitourinary: Negative.    Musculoskeletal:  Positive for arthralgias and myalgias.   Skin: Negative.    Allergic/Immunologic: Positive for environmental allergies.   Neurological: Negative.    Hematological:  Bruises/bleeds easily.   Psychiatric/Behavioral: Negative.       VISIT VITALS:  Vitals:    09/28/23 1321   BP: 129/76   BP Location: Left arm   Patient Position: Sitting   Pulse: 99   SpO2: 96%   Weight: 88.4 kg (194 lb 12.8 oz)   Height: 177.8 cm (70\")      /76 (BP Location: Left arm, Patient Position: Sitting)   Pulse 99   Ht 177.8 cm (70\")   Wt 88.4 kg (194 lb 12.8 oz)   SpO2 96%   BMI 27.95 kg/m²     RECENT LABS:    Objective       Physical Exam  Vitals and nursing note reviewed.   Constitutional:       General: He is not in acute distress.     Appearance: He is well-developed.   HENT:      Head: Normocephalic and atraumatic.   Eyes:      Conjunctiva/sclera: Conjunctivae normal.      Pupils: Pupils are equal, round, and reactive to light.   Neck:      Vascular: Carotid bruit (very soft left bruit) present. No hepatojugular reflux or JVD.      Trachea: No tracheal deviation.      Comments: Nl. Carotid upstrokes  Cardiovascular:      Rate and Rhythm: Normal rate and regular rhythm.      Heart sounds: Normal heart sounds, S1 normal and S2 normal. No murmur heard.    No friction rub. S4 sounds present. "   Pulmonary:      Effort: Pulmonary effort is normal.      Breath sounds: Normal breath sounds. No wheezing, rhonchi or rales.      Comments: Nl. Expir. Phase  Nl. Breath sound intensity    Abdominal:      General: Bowel sounds are normal. There is no distension or abdominal bruit.      Palpations: Abdomen is soft. There is no mass.      Tenderness: There is no abdominal tenderness. There is no guarding or rebound.      Comments: No organomegaly   Musculoskeletal:         General: No tenderness or deformity. Normal range of motion.      Cervical back: Normal range of motion and neck supple.      Right lower leg: No edema.      Left lower leg: No edema.      Comments: BLE, no edema, pedal pulses not assessed     Skin:     General: Skin is warm and dry.      Coloration: Skin is not pale.      Findings: No erythema or rash.   Neurological:      Mental Status: He is alert and oriented to person, place, and time.   Psychiatric:         Behavior: Behavior normal.         Thought Content: Thought content normal.         Judgment: Judgment normal.       Procedures      Assessment & Plan   #1.  Con artery disease.  The patient underwent successful IVUS guided stenting of the LAD in the setting of low-level medically refractory symptoms as described above.  We will continue dual antiplatelet therapy.  The patient is not on high-dose statin we will recheck fasting lipid profile and liver enzymes.    2.  Controlled systemic hypertension.    3.  Treated dyslipidemia.  See #1 above.    4.  Anthony will follow with Dr. Reyes as instructed we will plan on seeing him in follow-up in 6 months or on appearing basis as discussed.   Diagnosis Plan   1. Coronary artery disease due to calcified coronary lesion        2. Mixed hyperlipidemia        3. Precordial pain        4. Shortness of breath        5. RAMONA on CPAP        6. Former smoker            No follow-ups on file.         Anthony Sahu  reports that he has quit smoking. His  smoking use included cigarettes. He has never used smokeless tobacco.. I have educated him on the risk of diseases from using tobacco products such as cancer, COPD, and heart disease.          Advance Care Planning   ACP discussion was held with the patient during this visit. Patient has an advance directive in EMR which is still valid.                        Electronically signed by:    Scribed for Ghassan Bo MD by Yuly Fish LPN on September 28, 2023  at 13:28 EDT    I, Ghassan Bo MD personally performed the services described in this documentation as scribed by the above named individual in my presence, and it is both accurate and complete. September 28, 2023 13:28 EDT      Dictated Utilizing Dragon Dictation: Part of this note may be an electronic transcription/translation of spoken language to printed text using the Dragon Dictation System.

## 2023-10-04 ENCOUNTER — TELEPHONE (OUTPATIENT)
Dept: CARDIOLOGY | Facility: CLINIC | Age: 73
End: 2023-10-04
Payer: MEDICARE

## 2023-10-04 DIAGNOSIS — R74.8 ELEVATED LIVER ENZYMES: Primary | ICD-10-CM

## 2023-10-04 NOTE — TELEPHONE ENCOUNTER
PATIENT AWARE NEEDS TO GET LIPID DRAWN IF IT WAS NOT DRAWN AND NEEDS TO COME DOWNSTAIRS IN 3 MO. FOR BMP DUE TO ELEVATED LIVER ENZYMES. VERBALIZED OK. HE WAS ALSO INQUIRING ON COST WITH SCHEDULED CAROTID U/S DUE TO HAVING [employer]ICARE. PER ADRIEL, . INSTRUCT HIM TO CALL THE NUMBER ON THE BACK OF HIS INSURANCE CARD AND INQUIRE. VERBALIZED. PH,LPN            ----- Message from Ghassan Bo MD sent at 10/4/2023  8:50 AM EDT -----  Have BMP drawn in 3 mo. If no lipid done then needs drawn.  ----- Message -----  From: Karen Whitfield RegSched Rep  Sent: 10/2/2023  11:52 AM EDT  To: Ghassan Bo MD

## 2023-10-12 ENCOUNTER — LAB (OUTPATIENT)
Dept: LAB | Facility: HOSPITAL | Age: 73
End: 2023-10-12
Payer: MEDICARE

## 2023-10-12 DIAGNOSIS — Z87.891 FORMER SMOKER: ICD-10-CM

## 2023-10-12 DIAGNOSIS — G47.33 OSA ON CPAP: ICD-10-CM

## 2023-10-12 DIAGNOSIS — R07.2 PRECORDIAL PAIN: ICD-10-CM

## 2023-10-12 DIAGNOSIS — R09.89 BRUIT OF LEFT CAROTID ARTERY: ICD-10-CM

## 2023-10-12 DIAGNOSIS — R06.02 SHORTNESS OF BREATH: ICD-10-CM

## 2023-10-12 DIAGNOSIS — I25.10 CORONARY ARTERY DISEASE DUE TO CALCIFIED CORONARY LESION: ICD-10-CM

## 2023-10-12 DIAGNOSIS — E78.2 MIXED HYPERLIPIDEMIA: ICD-10-CM

## 2023-10-12 DIAGNOSIS — I25.84 CORONARY ARTERY DISEASE DUE TO CALCIFIED CORONARY LESION: ICD-10-CM

## 2023-10-12 LAB
CHOLEST SERPL-MCNC: 134 MG/DL (ref 0–200)
HDLC SERPL-MCNC: 53 MG/DL (ref 40–60)
LDLC SERPL CALC-MCNC: 70 MG/DL (ref 0–100)
LDLC/HDLC SERPL: 1.35 {RATIO}
TRIGL SERPL-MCNC: 47 MG/DL (ref 0–150)
VLDLC SERPL-MCNC: 11 MG/DL (ref 5–40)

## 2023-10-12 PROCEDURE — 80061 LIPID PANEL: CPT | Performed by: INTERNAL MEDICINE

## 2023-10-17 ENCOUNTER — TELEPHONE (OUTPATIENT)
Dept: CARDIOLOGY | Facility: CLINIC | Age: 73
End: 2023-10-17
Payer: MEDICARE

## 2023-10-18 ENCOUNTER — TELEPHONE (OUTPATIENT)
Dept: CARDIOLOGY | Facility: CLINIC | Age: 73
End: 2023-10-18
Payer: MEDICARE

## 2023-10-18 NOTE — TELEPHONE ENCOUNTER
----- Message from JEFFERY You sent at 10/18/2023  8:36 AM EDT -----  Routine follow-up.  ----- Message -----  From: Merly Kent RegSched Rep  Sent: 10/16/2023   3:17 PM EDT  To: JEFFERY You      ----- Message -----  From: Lab, Background User  Sent: 10/12/2023   3:33 PM EDT  To: Ghassan Bo MD

## 2023-10-18 NOTE — TELEPHONE ENCOUNTER
Notified patient's spouse of lab result's and recommendation's to keep routine follow up appointment.

## 2023-10-19 ENCOUNTER — HOSPITAL ENCOUNTER (OUTPATIENT)
Dept: CARDIOLOGY | Facility: HOSPITAL | Age: 73
Discharge: HOME | End: 2023-10-19
Payer: MEDICARE

## 2023-10-19 DIAGNOSIS — E78.2 MIXED HYPERLIPIDEMIA: ICD-10-CM

## 2023-10-19 DIAGNOSIS — I25.84 CORONARY ARTERY DISEASE DUE TO CALCIFIED CORONARY LESION: ICD-10-CM

## 2023-10-19 DIAGNOSIS — I25.10 CORONARY ARTERY DISEASE DUE TO CALCIFIED CORONARY LESION: ICD-10-CM

## 2023-10-19 DIAGNOSIS — R09.89 BRUIT OF LEFT CAROTID ARTERY: ICD-10-CM

## 2023-10-19 LAB
BH CV XLRA MEAS LEFT DIST CCA EDV: -25.1 CM/SEC
BH CV XLRA MEAS LEFT DIST CCA PSV: -129 CM/SEC
BH CV XLRA MEAS LEFT DIST ICA EDV: -41.6 CM/SEC
BH CV XLRA MEAS LEFT DIST ICA PSV: -143 CM/SEC
BH CV XLRA MEAS LEFT ICA/CCA RATIO: 1.2
BH CV XLRA MEAS LEFT MID ICA EDV: -38.7 CM/SEC
BH CV XLRA MEAS LEFT MID ICA PSV: -157 CM/SEC
BH CV XLRA MEAS LEFT PROX CCA EDV: 18.2 CM/SEC
BH CV XLRA MEAS LEFT PROX CCA PSV: 126 CM/SEC
BH CV XLRA MEAS LEFT PROX ECA PSV: -132 CM/SEC
BH CV XLRA MEAS LEFT PROX ICA EDV: -26.1 CM/SEC
BH CV XLRA MEAS LEFT PROX ICA PSV: -152 CM/SEC
BH CV XLRA MEAS LEFT VERTEBRAL A EDV: -18.2 CM/SEC
BH CV XLRA MEAS LEFT VERTEBRAL A PSV: -65.9 CM/SEC
BH CV XLRA MEAS RIGHT DIST CCA EDV: -32.1 CM/SEC
BH CV XLRA MEAS RIGHT DIST CCA PSV: -134 CM/SEC
BH CV XLRA MEAS RIGHT DIST ICA EDV: -39.1 CM/SEC
BH CV XLRA MEAS RIGHT DIST ICA PSV: -110 CM/SEC
BH CV XLRA MEAS RIGHT ICA/CCA RATIO: 0.8
BH CV XLRA MEAS RIGHT MID ICA EDV: -24.2 CM/SEC
BH CV XLRA MEAS RIGHT MID ICA PSV: -92.6 CM/SEC
BH CV XLRA MEAS RIGHT PROX CCA EDV: 15.5 CM/SEC
BH CV XLRA MEAS RIGHT PROX CCA PSV: 108 CM/SEC
BH CV XLRA MEAS RIGHT PROX ECA PSV: -134 CM/SEC
BH CV XLRA MEAS RIGHT PROX ICA EDV: -29 CM/SEC
BH CV XLRA MEAS RIGHT PROX ICA PSV: -105 CM/SEC
BH CV XLRA MEAS RIGHT VERTEBRAL A EDV: -16.2 CM/SEC
BH CV XLRA MEAS RIGHT VERTEBRAL A PSV: -53.4 CM/SEC

## 2023-10-19 PROCEDURE — 93880 EXTRACRANIAL BILAT STUDY: CPT

## 2023-11-02 ENCOUNTER — TELEPHONE (OUTPATIENT)
Dept: CARDIOLOGY | Facility: CLINIC | Age: 73
End: 2023-11-02
Payer: MEDICARE

## 2023-11-02 DIAGNOSIS — R07.2 PRECORDIAL PAIN: ICD-10-CM

## 2023-11-02 DIAGNOSIS — I10 ESSENTIAL HYPERTENSION: ICD-10-CM

## 2023-11-02 RX ORDER — AMLODIPINE BESYLATE 5 MG/1
TABLET ORAL
Qty: 30 TABLET | Refills: 5 | Status: SHIPPED | OUTPATIENT
Start: 2023-11-02

## 2023-11-02 NOTE — TELEPHONE ENCOUNTER
PATIENT NOTIFIED OF CAROTID U/S RESULTS AND TO KEEP F/U APPT. VERBALIZED OK. LUIS LUJAN            ----- Message from Ghassan Bo MD sent at 11/2/2023  9:00 AM EDT -----  Keep routine follow-up as scheduled.  ----- Message -----  From: Ghassan Bo MD  Sent: 10/29/2023   7:01 PM EDT  To: Ghassan Bo MD       Anemia due to stage 5 chronic kidney disease, not on chronic dialysis

## 2024-01-04 ENCOUNTER — LAB (OUTPATIENT)
Dept: LAB | Facility: HOSPITAL | Age: 74
End: 2024-01-04
Payer: MEDICARE

## 2024-01-04 PROCEDURE — 80048 BASIC METABOLIC PNL TOTAL CA: CPT | Performed by: INTERNAL MEDICINE

## 2024-03-28 ENCOUNTER — OFFICE VISIT (OUTPATIENT)
Dept: CARDIOLOGY | Facility: CLINIC | Age: 74
End: 2024-03-28
Payer: MEDICARE

## 2024-03-28 VITALS
HEART RATE: 96 BPM | SYSTOLIC BLOOD PRESSURE: 101 MMHG | WEIGHT: 176.6 LBS | OXYGEN SATURATION: 99 % | HEIGHT: 70 IN | DIASTOLIC BLOOD PRESSURE: 71 MMHG | BODY MASS INDEX: 25.28 KG/M2

## 2024-03-28 DIAGNOSIS — I25.84 CORONARY ARTERY DISEASE DUE TO CALCIFIED CORONARY LESION: Primary | ICD-10-CM

## 2024-03-28 DIAGNOSIS — G47.33 OSA ON CPAP: ICD-10-CM

## 2024-03-28 DIAGNOSIS — R07.2 PRECORDIAL PAIN: ICD-10-CM

## 2024-03-28 DIAGNOSIS — I25.10 CORONARY ARTERY DISEASE DUE TO CALCIFIED CORONARY LESION: Primary | ICD-10-CM

## 2024-03-28 DIAGNOSIS — Z87.891 FORMER SMOKER: ICD-10-CM

## 2024-03-28 DIAGNOSIS — E78.2 MIXED HYPERLIPIDEMIA: ICD-10-CM

## 2024-03-28 DIAGNOSIS — R06.02 SHORTNESS OF BREATH: ICD-10-CM

## 2024-03-28 PROCEDURE — 99214 OFFICE O/P EST MOD 30 MIN: CPT | Performed by: INTERNAL MEDICINE

## 2024-03-28 RX ORDER — SUCRALFATE 1 G/1
1 TABLET ORAL 4 TIMES DAILY
COMMUNITY

## 2024-03-28 NOTE — PROGRESS NOTES
Subjective   Anthony Sahu is a 73 y.o. male     Chief Complaint   Patient presents with    Follow-up     Here for 6 mo. F/u    Coronary Artery Disease    Hyperlipidemia    Chest Pain    Shortness of Breath    Sleep Apnea       PROBLEM LIST:     1. CAD  1.1 [facility], , demonstrated mod. Mid-LAD disease. EF 65%  1.2 Stress test, 8-3-2021, no ischemia  1.3 [facility], 9-, IVUS & stenting of LAD  2. Hyperlipidemia  3. RAMONA, uses c-pap, followed by Dr. Daniels.  4. Basal cell Carcinoma left ear  5. GERD  6. Shortness of breath  7. Former smoker  8. Echo, 8-3-2021, EF 50-55%, 3D 57%, grade 1 DD, trivial MR, trivial-mild TR, pulm. Pressures low 30's  9. Palpitations  9.1 Event Monitor, 9-5-19 - 10-4-19, sinus, SB sleeping hors, occas. PVC'S    Specialty Problems          Cardiology Problems    Coronary artery disease due to calcified coronary lesion        Mixed hyperlipidemia             HPI:    Anthony returns for follow-up.    He states that he continues to have right parasternal and retrosternal chest discomfort which is well localized and which is nonexertional and nonpositional.  At the time of his last visit he described improved strength and stamina and decreased exertional dyspnea despite continued pain.  However now he states he has had no significant improvement had none after stenting.    Several weeks ago Anthony had retracted nausea and vomiting and anorexia.  He was found to have choledocholithiasis and underwent ERCP and stenting.  He is to have endoscopic cholecystectomy in the near future.  He describes mild lightheadedness and orthostatic symptoms since he has not been eating or drinking normally since surgery.  He states he is lost over 20 pounds.  Otherwise he is symptomatically unchanged.                    PRIOR MEDICATIONS    Current Outpatient Medications on File Prior to Visit   Medication Sig Dispense Refill    amLODIPine (NORVASC) 5 MG tablet TAKE ONE TABLET BY MOUTH DAILY 30 tablet 5    Aspirin  81 MG capsule Take 81 mg by mouth Daily. 90 capsule 3    Cholecalciferol (Vitamin D3) 50 MCG (2000 UT) tablet Take  by mouth Daily.      clopidogrel (PLAVIX) 75 MG tablet Take 1 tablet by mouth Daily. 90 tablet 3    donepezil (ARICEPT) 5 MG tablet Daily.      loperamide (IMODIUM) 2 MG capsule Take 1 capsule by mouth Daily.      multivitamin with minerals tablet tablet Take 1 tablet by mouth Daily.      pantoprazole (PROTONIX) 40 MG EC tablet Daily.      pravastatin (PRAVACHOL) 40 MG tablet TAKE ONE TABLET BY MOUTH DAILY 90 tablet 3    sucralfate (CARAFATE) 1 g tablet Take 1 tablet by mouth 4 (Four) Times a Day.      tamsulosin (FLOMAX) 0.4 MG capsule 24 hr capsule Take 1 capsule by mouth Daily.      ascorbic acid (VITAMIN C) 1000 MG tablet Take 1 tablet by mouth Daily.      nitroglycerin (NITROSTAT) 0.4 MG SL tablet 1 under the tongue as needed for angina, may repeat q5mins for up three doses with in 15 minutes (Patient not taking: Reported on 9/28/2023) 25 tablet 2    Omega-3 Fatty Acids (fish oil) 1000 MG capsule capsule Take 1 capsule by mouth Daily With Breakfast.       No current facility-administered medications on file prior to visit.       ALLERGIES:    Penicillins, Doxycycline, Imdur [isosorbide nitrate], Keflex [cephalexin], and Meclizine    PAST MEDICAL HISTORY:    Past Medical History:   Diagnosis Date    Cancer     basal cell carcinoma left ear    Chest pain     Coronary artery disease     GERD (gastroesophageal reflux disease)     History of cardiac cath 09/24/2010    Hyperlipidemia     Shortness of breath     Sleep apnea     uses c-pap followed by Dr. waters.    Stomach ulcer        SURGICAL HISTORY:    Past Surgical History:   Procedure Laterality Date    ENDOSCOPY AND COLONOSCOPY      EXTRACORPOREAL SHOCK WAVE LITHOTRIPSY (ESWL)      INGUINAL HERNIA REPAIR      TESTICLE SURGERY      removal x 1       SOCIAL HISTORY:    Social History     Socioeconomic History    Marital status:    Tobacco  "Use    Smoking status: Former     Types: Cigarettes    Smokeless tobacco: Never    Tobacco comments:     used to smoke approx 1 PPD for approx. 30 yrs.    Substance and Sexual Activity    Alcohol use: No    Drug use: No       FAMILY HISTORY:    Family History   Problem Relation Age of Onset    No Known Problems Mother     Stroke Father     Heart attack Father     Hypertension Father     Hyperlipidemia Father     Diabetes Father        Review of Systems   Constitutional:  Positive for fatigue.   HENT: Negative.     Eyes:  Positive for visual disturbance (glasses prn).   Respiratory:  Positive for shortness of breath.    Cardiovascular:  Positive for chest pain, palpitations and leg swelling.   Gastrointestinal: Negative.    Endocrine: Negative.    Genitourinary: Negative.    Musculoskeletal: Negative.    Skin: Negative.    Allergic/Immunologic: Positive for environmental allergies.   Neurological:  Positive for dizziness. Negative for syncope.   Hematological:  Bruises/bleeds easily.   Psychiatric/Behavioral: Negative.         VISIT VITALS:  Vitals:    03/28/24 0908   BP: 101/71   BP Location: Left arm   Patient Position: Sitting   Pulse: 96   SpO2: 99%   Weight: 80.1 kg (176 lb 9.6 oz)   Height: 177.8 cm (70\")      /71 (BP Location: Left arm, Patient Position: Sitting)   Pulse 96   Ht 177.8 cm (70\")   Wt 80.1 kg (176 lb 9.6 oz)   SpO2 99%   BMI 25.34 kg/m²     RECENT LABS:    Objective       Physical Exam  Vitals and nursing note reviewed.   Constitutional:       General: He is not in acute distress.     Appearance: He is well-developed.   HENT:      Head: Normocephalic and atraumatic.   Eyes:      Conjunctiva/sclera: Conjunctivae normal.      Pupils: Pupils are equal, round, and reactive to light.   Neck:      Vascular: No carotid bruit, hepatojugular reflux or JVD.      Trachea: No tracheal deviation.      Comments: Nl. Carotid upstrokes  Cardiovascular:      Rate and Rhythm: Normal rate and regular " rhythm. Occasional Extrasystoles are present.     Pulses:           Radial pulses are 2+ on the right side and 2+ on the left side.      Heart sounds: S1 normal and S2 normal. Murmur heard.      No friction rub. S4 sounds present.      Comments: 1-2/6 TR / MR  Pulmonary:      Effort: Pulmonary effort is normal.      Breath sounds: No wheezing, rhonchi or rales.      Comments: Nl. Expir. Phase  Nl. Breath sound intensity  Abdominal:      General: Bowel sounds are normal. There is no distension or abdominal bruit.      Palpations: Abdomen is soft. There is no mass.      Tenderness: There is no abdominal tenderness. There is no guarding or rebound.      Comments: No organomegaly   Musculoskeletal:         General: No tenderness or deformity. Normal range of motion.      Cervical back: Normal range of motion and neck supple.      Right lower leg: No edema.      Left lower leg: No edema.      Comments: LLE, no edema, palpable pedal pulses  RLE, no edema, palpable pedal pulses   Skin:     General: Skin is warm and dry.      Coloration: Skin is not pale.      Findings: No erythema or rash.   Neurological:      Mental Status: He is alert and oriented to person, place, and time.   Psychiatric:         Behavior: Behavior normal.         Thought Content: Thought content normal.         Judgment: Judgment normal.         Procedures      Assessment & Plan   #1.  Chest pain.  I feel the patient's chest pain is nonischemic in nature.  It did not improve after stenting although it appears that exertional dyspnea and functional capacity did.  He has ongoing GI evaluation.  He is at low risk for any anticipated procedures and needs no further restratification from a cardiac standpoint.  Will continue dual antiplatelet therapy.  Prior to cholecystectomy Plavix can be stopped but the patient should be continued on aspirin if that is possible.  Postoperatively we will plan on restarting with single antiplatelet therapy with  Plavix.    2.  Systemic hypertension.  Blood pressures are well-controlled.    3.  Treated dyslipidemia.    4.  Coronary artery disease.  Risk factor modification is being appropriately addressed.  Although the patient is not on high-dose statin LDL cholesterols been at goal.  We will continue to monitor.    5.  Mr. Rousseau will follow with Dr. Reyes as instructed, with GI and surgery as scheduled, and in our office in 6 to 12 months or on appearing basis as discussed.   Diagnosis Plan   1. Coronary artery disease due to calcified coronary lesion        2. Mixed hyperlipidemia        3. Precordial pain        4. Shortness of breath        5. RAMONA on CPAP        6. Former smoker            No follow-ups on file.         Anthony Sahu  reports that he has quit smoking. His smoking use included cigarettes. He has never used smokeless tobacco. I have educated him on the risk of diseases from using tobacco products such as cancer, COPD, and heart disease.             BMI is >= 25 and <30. (Overweight) The following options were offered after discussion;: pcp addressing       Advance Care Planning   ACP discussion was held with the patient during this visit. Patient has an advance directive in EMR which is still valid.             Electronically signed by:    Scribed for Ghassan Bo MD by Yuly Fish LPN on March 28, 2024  at 09:13 EDT    I, Ghassan Bo MD personally performed the services described in this documentation as scribed by the above named individual in my presence, and it is both accurate and complete. March 28, 2024 09:13 EDT      Dictated Utilizing Dragon Dictation: Part of this note may be an electronic transcription/translation of spoken language to printed text using the Dragon Dictation System.

## 2024-04-16 ENCOUNTER — TELEPHONE (OUTPATIENT)
Dept: CARDIOLOGY | Facility: CLINIC | Age: 74
End: 2024-04-16
Payer: MEDICARE

## 2024-04-16 NOTE — TELEPHONE ENCOUNTER
The [facility] received a fax that requires your attention. The document has been indexed to the patient’s chart for your review.      Reason for sending: EXTERNAL MEDICAL RECORD NOTIFICATION     Documents Description: PHYS ORD-[facility] CARDIAC CLEARANCE REQ-4.16.24    Name of Sender: [facility]     Date Indexed: 4.16.24

## 2024-04-16 NOTE — TELEPHONE ENCOUNTER
PER DR. GONZALEZ:l  Acceptable risk, if possible continue ASA but can stop plavix 5 days. If both meds required, may stop both 5 days prior and restart Plavix post procedure and d/c ASA completely. CALLED AND RELAYED ABOVE TO MR. LUU. HE STATES HE IS GOING TO HAVE ANOTHER SURGERY IN [place]. SCHEDULED FOR END OF MAY. HE IS AWARE TO CONTACT THEIR FACILITY AND HAVE A CLEARANCE FAXED TO OUR OFFICE. VERBALIZED HE UNDERSTOOD. CARDIAC CLEAR. FAXED TO DR. CRESPO OFFICE. PH,LPN

## 2024-05-21 ENCOUNTER — TELEPHONE (OUTPATIENT)
Dept: CARDIOLOGY | Facility: CLINIC | Age: 74
End: 2024-05-21
Payer: MEDICARE

## 2024-05-21 NOTE — TELEPHONE ENCOUNTER
Received cardiac clearance request from DR. BILL HARDY stating pt has removal of gallbladder stents  scheduled for 05/31/2024 and is requiring a cardiac clearance. Placed cardiac clearance request in Pat's inbox to review and address with provider.

## 2024-06-10 DIAGNOSIS — I10 ESSENTIAL HYPERTENSION: ICD-10-CM

## 2024-06-10 DIAGNOSIS — R07.2 PRECORDIAL PAIN: ICD-10-CM

## 2024-06-10 RX ORDER — AMLODIPINE BESYLATE 5 MG/1
5 TABLET ORAL DAILY
Qty: 30 TABLET | Refills: 5 | Status: SHIPPED | OUTPATIENT
Start: 2024-06-10

## 2024-08-20 ENCOUNTER — TELEPHONE (OUTPATIENT)
Dept: CARDIOLOGY | Facility: CLINIC | Age: 74
End: 2024-08-20
Payer: MEDICARE

## 2024-08-20 NOTE — TELEPHONE ENCOUNTER
Received cardiac clearance request from DR LIDA DALAL stating pt has EXTRACTIONS and is requiring a cardiac clearance. Placed cardiac clearance request in PAT'S inbox to review and address with provider.

## 2024-08-23 NOTE — TELEPHONE ENCOUNTER
PER DR. GONZALEZ, ACCEPTABLE RISK FOR DENTAL EXTRACTION PER DR. DALAL AND MAY HOLD PLAVIX 5 DAYS PRIOR BUT STAY ON ASA. CARDIAC CLEAR. FAXED TO THEIR OFFICE. PH,LPN

## 2024-10-01 ENCOUNTER — OFFICE VISIT (OUTPATIENT)
Dept: CARDIOLOGY | Facility: CLINIC | Age: 74
End: 2024-10-01
Payer: MEDICARE

## 2024-10-01 VITALS
WEIGHT: 194 LBS | BODY MASS INDEX: 27.77 KG/M2 | OXYGEN SATURATION: 98 % | HEIGHT: 70 IN | DIASTOLIC BLOOD PRESSURE: 76 MMHG | HEART RATE: 64 BPM | SYSTOLIC BLOOD PRESSURE: 117 MMHG

## 2024-10-01 DIAGNOSIS — I10 ESSENTIAL HYPERTENSION: ICD-10-CM

## 2024-10-01 DIAGNOSIS — R09.89 BRUIT OF LEFT CAROTID ARTERY: ICD-10-CM

## 2024-10-01 DIAGNOSIS — I25.10 CORONARY ARTERY DISEASE DUE TO CALCIFIED CORONARY LESION: ICD-10-CM

## 2024-10-01 DIAGNOSIS — I25.84 CORONARY ARTERY DISEASE DUE TO CALCIFIED CORONARY LESION: ICD-10-CM

## 2024-10-01 DIAGNOSIS — R07.2 PRECORDIAL PAIN: Primary | ICD-10-CM

## 2024-10-01 DIAGNOSIS — E78.2 MIXED HYPERLIPIDEMIA: ICD-10-CM

## 2024-10-01 PROCEDURE — 99214 OFFICE O/P EST MOD 30 MIN: CPT | Performed by: CLINICAL NURSE SPECIALIST

## 2024-10-01 NOTE — PROGRESS NOTES
Subjective     Anthony Sahu is a 73 y.o. male who presents today for Follow-up (6mth).    CHIEF COMPLIANT  Chief Complaint   Patient presents with    Follow-up     6mth       Active Problems:  1. CAD  1.1 [facility], , demonstrated mod. Mid-LAD disease. EF 65%  1.2 Stress test, 8-3-2021, no ischemia  1.3 [facility], 9-, IVUS & stenting of LAD  2. Hyperlipidemia  3. RAMONA, uses c-pap, followed by Dr. Daniels.  4. Basal cell Carcinoma left ear  5. GERD  6. Shortness of breath  7. Former smoker  8. Echo, 8-3-2021, EF 50-55%, 3D 57%, grade 1 DD, trivial MR, trivial-mild TR, pulm. Pressures low 30's  9. Palpitations  9.1 Event Monitor, 9-5-19 - 10-4-19, sinus, SB sleeping hors, occas. PVC'S    HPI  The patient is a 73-year-old male that returns for follow-up.  He continues to have intermittent midsternal chest pain which he states has been unchanged for around 1 1/2 to 2 years.  The patient states that randomly he will have a significant pain midsternal that will last around 5 seconds.  He did have a heart cath in September 2023 and had stenting of the LAD.  He states the chest pain symptoms were unchanged after this.  He has not noted any triggers.  The pain will only last for 5 seconds and therefore he has not needed to take any nitroglycerin when it occurs.  He has followed with GI who he states has consider doing another EGD if we do not feel as a cardiac etiology to his symptoms.  He denies worsening dyspnea, syncope, near syncope.  He has had some intermittent dizziness.  Heart rate and blood pressure stable today.    PRIOR MEDS  Current Outpatient Medications on File Prior to Visit   Medication Sig Dispense Refill    amLODIPine (NORVASC) 5 MG tablet Take 1 tablet by mouth Daily. (Patient taking differently: Take 0.5 tablets by mouth Daily.) 30 tablet 5    Aspirin 81 MG capsule Take 81 mg by mouth Daily.      clopidogrel (PLAVIX) 75 MG tablet Take 1 tablet by mouth Daily. 90 tablet 3    donepezil (ARICEPT) 5 MG  tablet Daily.      loperamide (IMODIUM) 2 MG capsule Take 1 capsule by mouth Daily.      multivitamin with minerals tablet tablet Take 1 tablet by mouth Daily.      nitroglycerin (NITROSTAT) 0.4 MG SL tablet 1 under the tongue as needed for angina, may repeat q5mins for up three doses with in 15 minutes 25 tablet 2    pantoprazole (PROTONIX) 40 MG EC tablet Daily.      pravastatin (PRAVACHOL) 40 MG tablet TAKE ONE TABLET BY MOUTH DAILY 90 tablet 3    sucralfate (CARAFATE) 1 g tablet Take 1 tablet by mouth 4 (Four) Times a Day.      tamsulosin (FLOMAX) 0.4 MG capsule 24 hr capsule Take 1 capsule by mouth Daily.      Cholecalciferol (Vitamin D3) 50 MCG (2000 UT) tablet Take  by mouth Daily.       No current facility-administered medications on file prior to visit.       ALLERGIES  Penicillins, Doxycycline, Imdur [isosorbide nitrate], Keflex [cephalexin], and Meclizine    HISTORY  Past Medical History:   Diagnosis Date    Cancer     basal cell carcinoma left ear    Chest pain     Coronary artery disease     GERD (gastroesophageal reflux disease)     History of cardiac cath 09/24/2010    Hyperlipidemia     Shortness of breath     Sleep apnea     uses c-pap followed by Dr. waters.    Stomach ulcer        Social History     Socioeconomic History    Marital status:    Tobacco Use    Smoking status: Former     Types: Cigarettes    Smokeless tobacco: Never    Tobacco comments:     used to smoke approx 1 PPD for approx. 30 yrs.    Substance and Sexual Activity    Alcohol use: No    Drug use: No       Family History   Problem Relation Age of Onset    No Known Problems Mother     Stroke Father     Heart attack Father     Hypertension Father     Hyperlipidemia Father     Diabetes Father        Review of Systems   Constitutional:  Positive for fatigue.   HENT:  Negative for congestion, rhinorrhea and sore throat.    Respiratory:  Positive for apnea (CPAP) and shortness of breath. Negative for chest tightness.   "  Cardiovascular:  Positive for chest pain (Unchanged), palpitations (Flutters) and leg swelling (BLE edema, goes down overnight).   Gastrointestinal: Negative.    Genitourinary: Negative.    Neurological:  Positive for dizziness (Seems to occur at random- had an episode of dizziness while sitting in chair in exam room). Negative for weakness, numbness and headaches.   Hematological:  Bruises/bleeds easily.   Psychiatric/Behavioral:  Negative for sleep disturbance.        Objective     VITALS: /76   Pulse 64   Ht 177.8 cm (70\")   Wt 88 kg (194 lb)   SpO2 98%   BMI 27.84 kg/m²     LABS:   Lab Results (most recent)       None            IMAGING:   No Images in the past 120 days found..    EXAM:  Physical Exam  Constitutional:       Appearance: Normal appearance.   Eyes:      Pupils: Pupils are equal, round, and reactive to light.   Cardiovascular:      Rate and Rhythm: Normal rate and regular rhythm.      Pulses:           Carotid pulses are 2+ on the right side and 2+ on the left side with bruit.       Radial pulses are 2+ on the right side and 2+ on the left side.        Dorsalis pedis pulses are 2+ on the right side and 2+ on the left side.        Posterior tibial pulses are 2+ on the right side and 2+ on the left side.      Heart sounds: Murmur heard.   Pulmonary:      Effort: Pulmonary effort is normal.      Breath sounds: Normal breath sounds.   Abdominal:      General: Bowel sounds are normal.      Palpations: Abdomen is soft.   Musculoskeletal:      Right lower leg: No edema.      Left lower leg: No edema.   Skin:     General: Skin is warm and dry.      Capillary Refill: Capillary refill takes less than 2 seconds.   Neurological:      General: No focal deficit present.      Mental Status: He is alert and oriented to person, place, and time.   Psychiatric:         Mood and Affect: Mood normal.         Thought Content: Thought content normal.         Procedure   Procedures       Assessment & Plan    " Diagnosis Plan   1. Precordial pain        2. Coronary artery disease due to calcified coronary lesion        3. Essential hypertension        4. Mixed hyperlipidemia        5. Bruit of left carotid artery          Plan:  1.  Precordial pain: The patient continues to have intermittent chest pain which has been unchanged for the past 1 1/2 to 2 years.  He did have stenting to the LAD in September 2023 which had no impact on his symptoms.  There was no other significant stenosis noted during cath.  I think it was a worse patient's chest pain symptoms seem to be nonischemic in nature.  He is undergoing ongoing GI evaluation.  Would recommend he continue with GI evaluation to look for alternate causes of chest pain symptoms.  2.  CAD: Continue medical management.  Dr. Bo recommended continuing single antiplatelet therapy with Plavix.  Current chest pain symptoms appear to be nonischemic in nature.  He does have sublingual nitroglycerin to use if needed.  We did review instructions on appropriate use and when to seek emergency treatment.  3.  Essential hypertension: Blood pressure currently under good control.  The patient was instructed to monitor BP at home and to notify our office if systolic BP is consistently greater than 130-135 systolic.  Goal BP is 130-135/70-80.  4.  Mixed hyperlipidemia: Continue statin.  Will periodically review lipid panel.  5.  Left carotid bruit: No hemodynamically significant stenosis per carotid Doppler in 2023.  Continue statin and Plavix.              Return in about 2 months (around 12/2/2024).    Anthony Sahu  reports that he has quit smoking. His smoking use included cigarettes. He has never used smokeless tobacco.     Advance Care Planning   ACP discussion was declined by the patient. Patient has an advance directive (not in EMR), copy requested.                      MEDS ORDERED DURING VISIT:  No orders of the defined types were placed in this encounter.      DISCONTINUED  MEDS DURING VISIT:   There are no discontinued medications.       This document has been electronically signed by ALMA Brantley  October 1, 2024 13:08 EDT    Dictated Utilizing Dragon Dictation: Part of this note may be an electronic transcription/translation of spoken language to printed text using the Dragon Dictation System

## 2024-10-30 ENCOUNTER — TELEPHONE (OUTPATIENT)
Dept: CARDIOLOGY | Facility: CLINIC | Age: 74
End: 2024-10-30
Payer: MEDICARE

## 2024-10-30 DIAGNOSIS — R07.2 PRECORDIAL PAIN: Primary | ICD-10-CM

## 2024-10-30 DIAGNOSIS — K21.9 GASTROESOPHAGEAL REFLUX DISEASE, UNSPECIFIED WHETHER ESOPHAGITIS PRESENT: ICD-10-CM

## 2024-10-30 DIAGNOSIS — R06.02 SHORTNESS OF BREATH: ICD-10-CM

## 2024-10-30 NOTE — TELEPHONE ENCOUNTER
Patient is wanting to move forward with referral to Dr Whitfield's office for noncardiac causes of chest pain. Looks like by Yandel last note she stated that he could move forward with GI evaluation . She thought patient was already having some testing scheduled with there office. Patient states that he does not have anything scheduled. I will place GI referral based on her approval in chart.

## 2024-10-30 NOTE — TELEPHONE ENCOUNTER
Caller: Anthony Sahu    Relationship: Self    Best call back number: [phone]    What is the best time to reach you: ANY    What was the call regarding: THE PATIENT WAS TOLD HE NEEDS TO SEE DR. RHODES-STACY HE HAS RECEIVED NO CALLS OR ANY INFORMATION ABOUT THAT. PLEASE CALL THE PATIENT TO ADVISE.     Is it okay if the provider responds through MyChart: NO

## 2024-11-05 ENCOUNTER — TELEPHONE (OUTPATIENT)
Dept: CARDIOLOGY | Facility: CLINIC | Age: 74
End: 2024-11-05
Payer: MEDICARE

## 2024-11-05 NOTE — TELEPHONE ENCOUNTER
Received cardiac clearance request from DR RAIMUNDO RHODES stating pt has EGD AND COLONOSCOPY scheduled for 12/04/2024 and is requiring a cardiac clearance. Placed cardiac clearance request in DERICK'S inbox to review and address with provider.

## 2024-11-05 NOTE — LETTER
November 7, 2024     Patient: Anthony Sahu   YOB: 1950   Date of Visit: 11/5/2024     To Whom It May Concern:    We build our practice on integrity and patient care. The highest compliment we can receive is the referral of friends, family and patients to our office. We want to take this time to thank you for considering our group as part of your care team.    The medical records for Anthony Sahu 1950 were reviewed for pre-operative clearance on 11/7/24. It has been determined that this patient has:  acceptable risk.    Anthony Sahu is currently on the following medications that will need to be held prior to surgery: Plavix should be held 5 days. He needs to take Aspirin 81mg daily while off of Plavix.     This pre-operative evaluation has been completed based on patient reported medical conditions at the date of this letter, this evaluation may have considered in part results of additional testing, completion of an EKG and patient reported symptoms at the time of their visit.    Anthony Sahu's pre-operative clearance is good for thirty(30) days from the date of this letter with no reported changes in health, symptoms or diagnosis from the patient, their primary care provider or your office.    Your office has reported the patient will under go endoscopy and colonoscopy on 12/4/24 with Dr. Whitfield. If this appointment is changed or moved outside of thirty(30) days from 11/7/24 this pre-operative clearance is void.    If you have any further questions please give our office a call.    Thank you for your trust,      ALMA King

## 2024-11-11 ENCOUNTER — TELEPHONE (OUTPATIENT)
Dept: CARDIOLOGY | Facility: CLINIC | Age: 74
End: 2024-11-11
Payer: MEDICARE

## 2024-11-11 NOTE — TELEPHONE ENCOUNTER
Received cardiac clearance request from DR URBINA stating pt has EXTRACTIONS and is requiring a cardiac clearance. Placed cardiac clearance request in DERICK'S inbox to review and address with provider.

## 2024-11-11 NOTE — TELEPHONE ENCOUNTER
REQUEST FOR CARDIAC CLEARANCE    Caller name: MONICA    Phone Number: [phone]    Surgeon's name: DR. URBINA    Type of planned surgery: EXTRACTIONS    Date of planned surgery: NA, WAITING TO GET CLEARANCE    Type of anesthesia: LOCALIZED    Have you been experiencing chest pain or shortness of breath? NOT TO KNOWLEDGE    Is your doctor requesting for you to stop any of your medications prior to your surgery?   PLAVIX 75 MG 3 DAYS PRIOR    Where should we fax the clearance to? FAX MACHINE DOES NOT WORK AS IT SHOULD. IS THERE ANY WAY TO HAVE THIS EMAILED?

## 2024-11-11 NOTE — LETTER
November 13, 2024     Patient: Anthony Sahu   YOB: 1950   Date of Visit: 11/11/2024     To Whom It May Concern:    We build our practice on integrity and patient care. The highest compliment we can receive is the referral of friends, family and patients to our office. We want to take this time to thank you for considering our group as part of your care team.    The medical records for Anthony Sahu 1950 were reviewed for pre-operative clearance on 11/13/24. It has been determined that this patient has:  acceptable risk.    Anthony Sahu is currently on the following medications that will need to be held prior to surgery: Plavix should be held 3 days. Take Aspirin 81mg daily, while off of Plavix.     This pre-operative evaluation has been completed based on patient reported medical conditions at the date of this letter, this evaluation may have considered in part results of additional testing, completion of an EKG and patient reported symptoms at the time of their visit.    Anthony Sahu's pre-operative clearance is good for thirty(30) days from the date of this letter with no reported changes in health, symptoms or diagnosis from the patient, their primary care provider or your office.    Your office has reported the patient will under go extraction of several teeth on an undetermined date with Dr. Acosta. If this appointment is changed or moved outside of thirty(30) days from 11/13/24 this pre-operative clearance is void.    If you have any further questions please give our office a call.    Thank you for your trust,      ALMA King

## 2024-12-11 ENCOUNTER — OFFICE VISIT (OUTPATIENT)
Dept: CARDIOLOGY | Facility: CLINIC | Age: 74
End: 2024-12-11
Payer: MEDICARE

## 2024-12-11 VITALS
SYSTOLIC BLOOD PRESSURE: 118 MMHG | DIASTOLIC BLOOD PRESSURE: 72 MMHG | WEIGHT: 191 LBS | BODY MASS INDEX: 27.35 KG/M2 | OXYGEN SATURATION: 97 % | HEART RATE: 64 BPM | HEIGHT: 70 IN

## 2024-12-11 DIAGNOSIS — E78.2 MIXED HYPERLIPIDEMIA: ICD-10-CM

## 2024-12-11 DIAGNOSIS — I10 ESSENTIAL HYPERTENSION: ICD-10-CM

## 2024-12-11 DIAGNOSIS — I25.10 CORONARY ARTERY DISEASE DUE TO CALCIFIED CORONARY LESION: Primary | ICD-10-CM

## 2024-12-11 DIAGNOSIS — I25.84 CORONARY ARTERY DISEASE DUE TO CALCIFIED CORONARY LESION: Primary | ICD-10-CM

## 2024-12-11 PROCEDURE — 99214 OFFICE O/P EST MOD 30 MIN: CPT | Performed by: CLINICAL NURSE SPECIALIST

## 2024-12-11 RX ORDER — CLOPIDOGREL BISULFATE 75 MG/1
75 TABLET ORAL DAILY
Qty: 90 TABLET | Refills: 3 | Status: SHIPPED | OUTPATIENT
Start: 2024-12-11

## 2024-12-11 NOTE — PROGRESS NOTES
Subjective     Anthony Sahu is a 73 y.o. male who presents today for Follow-up (2mth).    CHIEF COMPLIANT  Chief Complaint   Patient presents with    Follow-up     2mth       Active Problems:  1. CAD  1.1 [facility], , demonstrated mod. Mid-LAD disease. EF 65%  1.2 Stress test, 8-3-2021, no ischemia  1.3 [facility], 9-, IVUS & stenting of LAD  2. Hyperlipidemia  3. RAMONA, uses c-pap, followed by Dr. Daniels.  4. Basal cell Carcinoma left ear  5. GERD  6. Shortness of breath  7. Former smoker  8. Echo, 8-3-2021, EF 50-55%, 3D 57%, grade 1 DD, trivial MR, trivial-mild TR, pulm. Pressures low 30's  9. Palpitations  9.1 Event Monitor, 9-5-19 - 10-4-19, sinus, SB sleeping hors, occas. PVC'S    HPI  The patient is a 73 year old male that returns for follow up.  Since his last visit he did have a EGD and was found to have a hiatal hernia.  The patient states it is felt that was causing his chest pain symptoms.  He does state the symptoms have improved since his last visit.  He denies worsening dyspnea, syncope or near syncope.  Overall he states he is feeling better.  Heart rate and blood pressure stable.    PRIOR MEDS  Current Outpatient Medications on File Prior to Visit   Medication Sig Dispense Refill    amLODIPine (NORVASC) 5 MG tablet Take 1 tablet by mouth Daily. 30 tablet 5    Aspirin 81 MG capsule Take 81 mg by mouth Daily.      Cholecalciferol (Vitamin D3) 50 MCG (2000 UT) tablet Take  by mouth Daily.      donepezil (ARICEPT) 5 MG tablet Daily.      loperamide (IMODIUM) 2 MG capsule Take 1 capsule by mouth Daily.      multivitamin with minerals tablet tablet Take 1 tablet by mouth Daily.      nitroglycerin (NITROSTAT) 0.4 MG SL tablet 1 under the tongue as needed for angina, may repeat q5mins for up three doses with in 15 minutes 25 tablet 2    pantoprazole (PROTONIX) 40 MG EC tablet Daily.      pravastatin (PRAVACHOL) 40 MG tablet TAKE ONE TABLET BY MOUTH DAILY 90 tablet 3    sucralfate (CARAFATE) 1 g tablet  "Take 1 tablet by mouth 4 (Four) Times a Day.      tamsulosin (FLOMAX) 0.4 MG capsule 24 hr capsule Take 1 capsule by mouth Daily.      [DISCONTINUED] clopidogrel (PLAVIX) 75 MG tablet Take 1 tablet by mouth Daily. 90 tablet 3     No current facility-administered medications on file prior to visit.       ALLERGIES  Penicillins, Doxycycline, Imdur [isosorbide nitrate], Keflex [cephalexin], and Meclizine    HISTORY  Past Medical History:   Diagnosis Date    Cancer     basal cell carcinoma left ear    Chest pain     Coronary artery disease     GERD (gastroesophageal reflux disease)     Hiatal hernia     History of cardiac cath 09/24/2010    Hyperlipidemia     Shortness of breath     Sleep apnea     uses c-pap followed by Dr. waters.    Stomach ulcer        Social History     Socioeconomic History    Marital status:    Tobacco Use    Smoking status: Former     Types: Cigarettes    Smokeless tobacco: Never    Tobacco comments:     used to smoke approx 1 PPD for approx. 30 yrs.    Substance and Sexual Activity    Alcohol use: No    Drug use: No       Family History   Problem Relation Age of Onset    No Known Problems Mother     Stroke Father     Heart attack Father     Hypertension Father     Hyperlipidemia Father     Diabetes Father        Review of Systems   Constitutional:  Positive for fatigue.   Respiratory:  Positive for chest tightness (Improving) and shortness of breath (Unchanged).    Cardiovascular:  Positive for chest pain (From a hernia), palpitations (Unchanged) and leg swelling (BLe edema, goes down overnight).   Gastrointestinal:         Hiatal hernia        Neurological:  Positive for dizziness (\"not much\"). Negative for weakness, numbness and headaches.   Psychiatric/Behavioral:  Negative for sleep disturbance.        Objective     VITALS: /72   Pulse 64   Ht 177.8 cm (70\")   Wt 86.6 kg (191 lb)   SpO2 97%   BMI 27.41 kg/m²     LABS:   Lab Results (most recent)       None      "       IMAGING:   No Images in the past 120 days found..    EXAM:    Constitutional:       Appearance: Normal appearance.   Eyes:      Pupils: Pupils are equal, round, and reactive to light.   Cardiovascular:      Rate and Rhythm: Normal rate and regular rhythm.      Pulses:           Carotid pulses are 2+ on the right side and 2+ on the left side with bruit.       Radial pulses are 2+ on the right side and 2+ on the left side.        Dorsalis pedis pulses are 2+ on the right side and 2+ on the left side.        Posterior tibial pulses are 2+ on the right side and 2+ on the left side.      Heart sounds: Murmur heard.   Pulmonary:      Effort: Pulmonary effort is normal.      Breath sounds: Normal breath sounds.   Abdominal:      General: Bowel sounds are normal.      Palpations: Abdomen is soft.   Musculoskeletal:      Right lower leg: No edema.      Left lower leg: No edema.   Skin:     General: Skin is warm and dry.      Capillary Refill: Capillary refill takes less than 2 seconds.   Neurological:      General: No focal deficit present.      Mental Status: He is alert and oriented to person, place, and time.   Psychiatric:         Mood and Affect: Mood normal.         Thought Content: Thought content normal.   Copied information reviewed.  No change in assessment  Procedure   Procedures       Assessment & Plan    Diagnosis Plan   1. Coronary artery disease due to calcified coronary lesion        2. Essential hypertension        3. Mixed hyperlipidemia            1.  CAD: Continue medical management.  GI work up did show a hiatal hernia which was felt to be the cause of some of his chest pain.  The patient symptoms have improved.  He was advised to notify our office if symptoms of chest pain return or worsen.  2.  Essential hypertension: Blood pressure currently under good control.  Will continue current medication regimen.  The patient was instructed to monitor BP at home and to notify our office if systolic BP is  consistently greater than 130-135 systolic.  Goal BP is 130-135/70-80.  3.  Mixed hyperlipidemia: Continue statin.  Will periodically review lipid panel.    Return in about 6 months (around 6/11/2025).    Anthony Sahu  reports that he has quit smoking. His smoking use included cigarettes. He has never used smokeless tobacco.       Advance Care Planning  ACP discussion was declined by the patient. Patient has an advance directive (not in EMR), copy requested.            MEDS ORDERED DURING VISIT:  New Medications Ordered This Visit   Medications    clopidogrel (PLAVIX) 75 MG tablet     Sig: Take 1 tablet by mouth Daily.     Dispense:  90 tablet     Refill:  3       DISCONTINUED MEDS DURING VISIT:   Medications Discontinued During This Encounter   Medication Reason    clopidogrel (PLAVIX) 75 MG tablet Reorder          This document has been electronically signed by ALMA Brantley  December 11, 2024 13:12 EST    Dictated Utilizing Dragon Dictation: Part of this note may be an electronic transcription/translation of spoken language to printed text using the Dragon Dictation System

## 2025-01-12 DIAGNOSIS — R07.2 PRECORDIAL PAIN: ICD-10-CM

## 2025-01-12 DIAGNOSIS — I10 ESSENTIAL HYPERTENSION: ICD-10-CM

## 2025-01-13 RX ORDER — AMLODIPINE BESYLATE 5 MG/1
5 TABLET ORAL DAILY
Qty: 90 TABLET | Refills: 3 | Status: SHIPPED | OUTPATIENT
Start: 2025-01-13

## 2025-05-06 ENCOUNTER — OFFICE VISIT (OUTPATIENT)
Dept: CARDIOLOGY | Facility: CLINIC | Age: 75
End: 2025-05-06
Payer: MEDICARE

## 2025-05-06 VITALS
HEIGHT: 70 IN | WEIGHT: 197.4 LBS | OXYGEN SATURATION: 97 % | SYSTOLIC BLOOD PRESSURE: 122 MMHG | HEART RATE: 90 BPM | DIASTOLIC BLOOD PRESSURE: 77 MMHG | BODY MASS INDEX: 28.26 KG/M2

## 2025-05-06 DIAGNOSIS — R06.02 SHORTNESS OF BREATH: ICD-10-CM

## 2025-05-06 DIAGNOSIS — I25.84 CORONARY ARTERY DISEASE DUE TO CALCIFIED CORONARY LESION: ICD-10-CM

## 2025-05-06 DIAGNOSIS — G47.33 OSA ON CPAP: ICD-10-CM

## 2025-05-06 DIAGNOSIS — R07.2 PRECORDIAL PAIN: Primary | ICD-10-CM

## 2025-05-06 DIAGNOSIS — I25.10 CORONARY ARTERY DISEASE DUE TO CALCIFIED CORONARY LESION: ICD-10-CM

## 2025-05-06 DIAGNOSIS — E78.2 MIXED HYPERLIPIDEMIA: ICD-10-CM

## 2025-05-06 PROCEDURE — 99214 OFFICE O/P EST MOD 30 MIN: CPT | Performed by: INTERNAL MEDICINE

## 2025-05-06 NOTE — PROGRESS NOTES
Subjective   Anthony Sahu is a 74 y.o. male     Chief Complaint   Patient presents with    Follow-up     Here for routine f/u    Coronary Artery Disease    Hyperlipidemia    Sleep Apnea       PROBLEM LIST:     1. CAD  1.1 [facility], , demonstrated mod. Mid-LAD disease. EF 65%  1.2 Stress test, 8-3-2021, no ischemia  1.3 [facility], 9-, IVUS & stenting of LAD  2. Hyperlipidemia  3. RAMONA, uses c-pap, followed by Dr. Daniels.  4. Basal cell Carcinoma left ear  5. GERD  6. Shortness of breath  7. Former smoker  8. Echo, 8-3-2021, EF 50-55%, 3D 57%, grade 1 DD, trivial MR, trivial-mild TR, pulm. Pressures low 30's  9. Palpitations  9.1 Event Monitor, 9-5-19 - 10-4-19, sinus, SB sleeping hors, occas. PVC'S    Specialty Problems          Cardiology Problems    Coronary artery disease due to calcified coronary lesion        Mixed hyperlipidemia             HPI:  Anthony returns for follow-up of the above.      He continues to describe chest discomfort.  This is a sharp fleeting retrosternal discomfort, lasting only 1 or 2 seconds, without associated shortness of breath, nausea, or diaphoresis.  Pain is not pleuritic, none positional, and nonexertional.  Mr. Sahu has maintained the improve functional capacity and decreased exertional dyspnea that he achieved after LAD stenting.    Otherwise the patient is doing well.  He has no orthopnea, PND, and stable mild lower extremity edema.  He senses no palpitations.  He has no dizziness, presyncope, or syncope.  We have no recent fasting lipid profile data.                      PRIOR MEDICATIONS    Current Outpatient Medications on File Prior to Visit   Medication Sig Dispense Refill    amLODIPine (NORVASC) 5 MG tablet TAKE 1 TABLET BY MOUTH DAILY 90 tablet 3    Aspirin 81 MG capsule Take 81 mg by mouth Daily.      Cholecalciferol (Vitamin D3) 50 MCG (2000 UT) tablet Take  by mouth Daily.      clopidogrel (PLAVIX) 75 MG tablet Take 1 tablet by mouth Daily. 90 tablet 3     donepezil (ARICEPT) 5 MG tablet Daily.      loperamide (IMODIUM) 2 MG capsule Take 1 capsule by mouth Daily.      multivitamin with minerals tablet tablet Take 1 tablet by mouth Daily.      pantoprazole (PROTONIX) 40 MG EC tablet Daily.      pravastatin (PRAVACHOL) 40 MG tablet TAKE ONE TABLET BY MOUTH DAILY 90 tablet 3    sucralfate (CARAFATE) 1 g tablet Take 1 tablet by mouth. Ordered qid, but takes occas.      tamsulosin (FLOMAX) 0.4 MG capsule 24 hr capsule Take 1 capsule by mouth Daily.      nitroglycerin (NITROSTAT) 0.4 MG SL tablet 1 under the tongue as needed for angina, may repeat q5mins for up three doses with in 15 minutes (Patient not taking: Reported on 5/6/2025) 25 tablet 2     No current facility-administered medications on file prior to visit.       ALLERGIES:    Penicillins, Doxycycline, Imdur [isosorbide nitrate], Keflex [cephalexin], and Meclizine    PAST MEDICAL HISTORY:    Past Medical History:   Diagnosis Date    Cancer     basal cell carcinoma left ear    Chest pain     Coronary artery disease     GERD (gastroesophageal reflux disease)     Hiatal hernia     History of cardiac cath 09/24/2010    Hyperlipidemia     Shortness of breath     Sleep apnea     uses c-pap followed by Dr. waters.    Stomach ulcer        SURGICAL HISTORY:    Past Surgical History:   Procedure Laterality Date    CATARACT EXTRACTION Bilateral     COLONOSCOPY      CYSTOSCOPY W/ LASER LITHOTRIPSY      ENDOSCOPY AND COLONOSCOPY      ERCP W/ PLASTIC STENT PLACEMENT      with sphinectoplasty on 3-    EXTRACORPOREAL SHOCK WAVE LITHOTRIPSY (ESWL)      GALLBLADDER SURGERY      INGUINAL HERNIA REPAIR      TESTICLE SURGERY      removal x 1       SOCIAL HISTORY:    Social History     Socioeconomic History    Marital status:    Tobacco Use    Smoking status: Former     Types: Cigarettes    Smokeless tobacco: Never    Tobacco comments:     used to smoke approx 1 PPD for approx. 30 yrs.    Substance and Sexual Activity     "Alcohol use: No    Drug use: No       FAMILY HISTORY:    Family History   Problem Relation Age of Onset    No Known Problems Mother     Stroke Father     Heart attack Father     Hypertension Father     Hyperlipidemia Father     Diabetes Father        Review of Systems   Constitutional: Negative.    HENT: Negative.     Eyes:  Positive for visual disturbance (glasses prn).   Respiratory:  Positive for shortness of breath.    Cardiovascular:  Positive for chest pain (sharp pain to center chest.) and palpitations (occas.). Negative for leg swelling.   Gastrointestinal: Negative.    Endocrine: Negative.    Genitourinary: Negative.    Musculoskeletal:  Positive for arthralgias and myalgias.   Skin: Negative.    Allergic/Immunologic: Positive for environmental allergies.   Neurological: Negative.    Hematological:  Bruises/bleeds easily.   Psychiatric/Behavioral: Negative.         VISIT VITALS:  Vitals:    05/06/25 0909   BP: 122/77   BP Location: Left arm   Patient Position: Sitting   Pulse: 90   SpO2: 97%   Weight: 89.5 kg (197 lb 6.4 oz)   Height: 177.8 cm (70\")      /77 (BP Location: Left arm, Patient Position: Sitting)   Pulse 90   Ht 177.8 cm (70\")   Wt 89.5 kg (197 lb 6.4 oz)   SpO2 97%   BMI 28.32 kg/m²     RECENT LABS:    Objective       Physical Exam  Vitals and nursing note reviewed.   Constitutional:       General: He is not in acute distress.     Appearance: He is well-developed.   HENT:      Head: Normocephalic and atraumatic.   Eyes:      Conjunctiva/sclera: Conjunctivae normal.      Pupils: Pupils are equal, round, and reactive to light.   Neck:      Vascular: No carotid bruit, hepatojugular reflux or JVD.      Trachea: No tracheal deviation.      Comments: Nl. Carotid upstrokes  Cardiovascular:      Rate and Rhythm: Normal rate and regular rhythm.      Pulses:           Radial pulses are 2+ on the right side and 2+ on the left side.      Heart sounds: Normal heart sounds, S1 normal and S2 " normal. No murmur heard.     No friction rub. S4 sounds present. No S3 sounds.   Pulmonary:      Effort: Pulmonary effort is normal.      Breath sounds: Normal breath sounds. No wheezing, rhonchi or rales.      Comments: Nl. Expir. Phase  Nl. Breath sound intensity    Abdominal:      General: Bowel sounds are normal. There is no distension or abdominal bruit.      Palpations: Abdomen is soft. There is no mass.      Tenderness: There is no abdominal tenderness. There is no guarding or rebound.      Comments: No organomegaly   Musculoskeletal:         General: No tenderness or deformity. Normal range of motion.      Cervical back: Normal range of motion and neck supple.      Right lower leg: Edema present.      Left lower leg: Edema present.      Comments: BLE, trace edema at sock line, palpable pedal pulses  Mild kyphosis     Skin:     General: Skin is warm and dry.      Coloration: Skin is not pale.      Findings: No erythema or rash.   Neurological:      Mental Status: He is alert and oriented to person, place, and time.   Psychiatric:         Behavior: Behavior normal.         Thought Content: Thought content normal.         Judgment: Judgment normal.         Procedures      Assessment & Plan   #1.  Chest pain.  The patient's symptoms are very atypical for angina versus nonanginal in nature.  Given the fact that the patient has maintained the improvement he achieved after stenting I do not think that ischemia assessment is indicated at this time.  However, I would like to check a D-dimer.    2.  Dyslipidemia.  We have no recent fasting lipid profile.  Mr. Sahu will check that through Dr. Reyes's lab with recommendations based on findings.    3.  Controlled systemic hypertension.    4.  Anthony is always had some degree of kyphosis.  This appears to be worsening.  I have asked him to address this with Dr. Reyes.    5.  The patient will follow with Dr. Reyes as instructed by his office and we will plan on seeing  him in follow-up in 1 year or on an as-needed basis as discussed   Diagnosis Plan   1. Precordial pain        2. Mixed hyperlipidemia        3. Coronary artery disease due to calcified coronary lesion        4. Shortness of breath        5. RAMONA on CPAP            No follow-ups on file.         Anthony Sahu  reports that he has quit smoking. His smoking use included cigarettes. He has never used smokeless tobacco. I have educated him on the risk of diseases from using tobacco products such as cancer, COPD, and heart disease.       Advance Care Planning   ACP discussion was held with the patient during this visit. Patient has an advance directive in EMR which is still valid.               DO YOU VAPE? NO    BMI is >= 25 and <30. (Overweight) The following options were offered after discussion;: pcp addressing             Electronically signed by:    Scribed for Ghassan Bo MD by Yuly Fish LPN on May 6, 2025  at 09:13 EDT    I, Ghassan Bo MD personally performed the services described in this documentation as scribed by the above named individual in my presence, and it is both accurate and complete. May 6, 2025 09:13 EDT      Dictated Utilizing Dragon Dictation: Part of this note may be an electronic transcription/translation of spoken language to printed text using the Dragon Dictation System.

## 2025-05-07 ENCOUNTER — LAB (OUTPATIENT)
Dept: LAB | Facility: HOSPITAL | Age: 75
End: 2025-05-07
Payer: MEDICARE

## 2025-05-07 DIAGNOSIS — E78.2 MIXED HYPERLIPIDEMIA: ICD-10-CM

## 2025-05-07 DIAGNOSIS — I25.84 CORONARY ARTERY DISEASE DUE TO CALCIFIED CORONARY LESION: ICD-10-CM

## 2025-05-07 DIAGNOSIS — I25.10 CORONARY ARTERY DISEASE DUE TO CALCIFIED CORONARY LESION: ICD-10-CM

## 2025-05-07 LAB
ALBUMIN SERPL-MCNC: 4.1 G/DL (ref 3.5–5.2)
ALP SERPL-CCNC: 96 U/L (ref 39–117)
ALT SERPL W P-5'-P-CCNC: 13 U/L (ref 1–41)
AST SERPL-CCNC: 20 U/L (ref 1–40)
BILIRUB CONJ SERPL-MCNC: 0.4 MG/DL (ref 0–0.3)
BILIRUB INDIRECT SERPL-MCNC: 0.8 MG/DL
BILIRUB SERPL-MCNC: 1.2 MG/DL (ref 0–1.2)
CHOLEST SERPL-MCNC: 141 MG/DL (ref 0–200)
HDLC SERPL-MCNC: 44 MG/DL (ref 40–60)
LDLC SERPL CALC-MCNC: 80 MG/DL (ref 0–100)
LDLC/HDLC SERPL: 1.8 {RATIO}
PROT SERPL-MCNC: 7.7 G/DL (ref 6–8.5)
TRIGL SERPL-MCNC: 89 MG/DL (ref 0–150)
VLDLC SERPL-MCNC: 17 MG/DL (ref 5–40)

## 2025-05-07 PROCEDURE — 85379 FIBRIN DEGRADATION QUANT: CPT | Performed by: INTERNAL MEDICINE

## 2025-05-07 PROCEDURE — 80076 HEPATIC FUNCTION PANEL: CPT

## 2025-05-07 PROCEDURE — 80061 LIPID PANEL: CPT

## 2025-05-09 ENCOUNTER — RESULTS FOLLOW-UP (OUTPATIENT)
Dept: CARDIOLOGY | Facility: CLINIC | Age: 75
End: 2025-05-09
Payer: MEDICARE

## 2025-05-09 DIAGNOSIS — E78.2 MIXED HYPERLIPIDEMIA: Primary | ICD-10-CM

## 2025-05-09 DIAGNOSIS — I25.10 CORONARY ARTERY DISEASE DUE TO CALCIFIED CORONARY LESION: ICD-10-CM

## 2025-05-09 DIAGNOSIS — I25.84 CORONARY ARTERY DISEASE DUE TO CALCIFIED CORONARY LESION: ICD-10-CM

## 2025-05-09 RX ORDER — EZETIMIBE 10 MG/1
10 TABLET ORAL DAILY
Qty: 30 TABLET | Refills: 11 | Status: SHIPPED | OUTPATIENT
Start: 2025-05-09

## 2025-05-09 NOTE — TELEPHONE ENCOUNTER
PATIENT NOTIFIED OF CHOL. RESULTS AND HE WAS AGREEABLE TO ZETIA 10 MG DAILY WITH REPEAT LABS IN 8 WEEKS DOWNSTAIRS. VERBALIZED HE UNDERSTOOD. SCRIPT SENT TO PHARMNafisa LUJAN LPN          ----- Message from Ghassan oB sent at 5/9/2025  1:43 PM EDT -----  Add zetia 10 mg daily.  Follow-up fasting lipid profile liver enzymes in 8 weeks.  ----- Message -----  From: Lab, Background User  Sent: 5/7/2025   2:38 PM EDT  To: Ghassan Bo MD